# Patient Record
Sex: FEMALE | Race: WHITE | NOT HISPANIC OR LATINO | Employment: FULL TIME | URBAN - METROPOLITAN AREA
[De-identification: names, ages, dates, MRNs, and addresses within clinical notes are randomized per-mention and may not be internally consistent; named-entity substitution may affect disease eponyms.]

---

## 2019-02-05 ENCOUNTER — HOSPITAL ENCOUNTER (EMERGENCY)
Facility: CLINIC | Age: 54
Discharge: HOME | End: 2019-02-05
Attending: EMERGENCY MEDICINE | Admitting: EMERGENCY MEDICINE
Payer: COMMERCIAL

## 2019-02-05 VITALS
HEART RATE: 65 BPM | OXYGEN SATURATION: 98 % | WEIGHT: 134 LBS | BODY MASS INDEX: 21.53 KG/M2 | DIASTOLIC BLOOD PRESSURE: 63 MMHG | SYSTOLIC BLOOD PRESSURE: 122 MMHG | TEMPERATURE: 97.9 F | HEIGHT: 66 IN | RESPIRATION RATE: 16 BRPM

## 2019-02-05 DIAGNOSIS — R19.5 STOOL CLAY COLOR: ICD-10-CM

## 2019-02-05 LAB
ALBUMIN SERPL-MCNC: 3.3 G/DL (ref 3.4–5)
ALBUMIN UR-MCNC: NEGATIVE MG/DL
ALP SERPL-CCNC: 54 U/L (ref 40–150)
ALT SERPL W P-5'-P-CCNC: 35 U/L (ref 0–50)
ANION GAP SERPL CALCULATED.3IONS-SCNC: 5 MMOL/L (ref 3–14)
APPEARANCE UR: CLEAR
AST SERPL W P-5'-P-CCNC: 34 U/L (ref 0–45)
BASOPHILS # BLD AUTO: 0 10E9/L (ref 0–0.2)
BASOPHILS NFR BLD AUTO: 0.7 %
BILIRUB DIRECT SERPL-MCNC: 0.1 MG/DL (ref 0–0.2)
BILIRUB SERPL-MCNC: 0.3 MG/DL (ref 0.2–1.3)
BILIRUB UR QL STRIP: NEGATIVE
BUN SERPL-MCNC: 10 MG/DL (ref 7–30)
CALCIUM SERPL-MCNC: 8.9 MG/DL (ref 8.5–10.1)
CHLORIDE SERPL-SCNC: 105 MMOL/L (ref 94–109)
CO2 SERPL-SCNC: 29 MMOL/L (ref 20–32)
COLOR UR AUTO: NORMAL
CREAT SERPL-MCNC: 0.65 MG/DL (ref 0.52–1.04)
DIFFERENTIAL METHOD BLD: ABNORMAL
EOSINOPHIL # BLD AUTO: 0.1 10E9/L (ref 0–0.7)
EOSINOPHIL NFR BLD AUTO: 3.8 %
ERYTHROCYTE [DISTWIDTH] IN BLOOD BY AUTOMATED COUNT: 12.3 % (ref 10–15)
GFR SERPL CREATININE-BSD FRML MDRD: >90 ML/MIN/{1.73_M2}
GLUCOSE SERPL-MCNC: 75 MG/DL (ref 70–99)
GLUCOSE UR STRIP-MCNC: NEGATIVE MG/DL
HCT VFR BLD AUTO: 37.7 % (ref 35–47)
HGB BLD-MCNC: 12.9 G/DL (ref 11.7–15.7)
HGB UR QL STRIP: NEGATIVE
IMM GRANULOCYTES # BLD: 0 10E9/L (ref 0–0.4)
IMM GRANULOCYTES NFR BLD: 0.3 %
KETONES UR STRIP-MCNC: NEGATIVE MG/DL
LEUKOCYTE ESTERASE UR QL STRIP: NEGATIVE
LIPASE SERPL-CCNC: 60 U/L (ref 73–393)
LYMPHOCYTES # BLD AUTO: 1.1 10E9/L (ref 0.8–5.3)
LYMPHOCYTES NFR BLD AUTO: 38.6 %
MCH RBC QN AUTO: 32 PG (ref 26.5–33)
MCHC RBC AUTO-ENTMCNC: 34.2 G/DL (ref 31.5–36.5)
MCV RBC AUTO: 94 FL (ref 78–100)
MONOCYTES # BLD AUTO: 0.4 10E9/L (ref 0–1.3)
MONOCYTES NFR BLD AUTO: 12.6 %
NEUTROPHILS # BLD AUTO: 1.3 10E9/L (ref 1.6–8.3)
NEUTROPHILS NFR BLD AUTO: 44 %
NITRATE UR QL: NEGATIVE
NRBC # BLD AUTO: 0 10*3/UL
NRBC BLD AUTO-RTO: 0 /100
PH UR STRIP: 6 PH (ref 5–7)
PLATELET # BLD AUTO: 216 10E9/L (ref 150–450)
POTASSIUM SERPL-SCNC: 3.5 MMOL/L (ref 3.4–5.3)
PROT SERPL-MCNC: 6.9 G/DL (ref 6.8–8.8)
RBC # BLD AUTO: 4.03 10E12/L (ref 3.8–5.2)
SODIUM SERPL-SCNC: 139 MMOL/L (ref 133–144)
SOURCE: NORMAL
SP GR UR STRIP: 1 (ref 1–1.03)
UROBILINOGEN UR STRIP-MCNC: NORMAL MG/DL (ref 0–2)
WBC # BLD AUTO: 2.9 10E9/L (ref 4–11)

## 2019-02-05 PROCEDURE — 81003 URINALYSIS AUTO W/O SCOPE: CPT | Performed by: EMERGENCY MEDICINE

## 2019-02-05 PROCEDURE — 80076 HEPATIC FUNCTION PANEL: CPT | Performed by: EMERGENCY MEDICINE

## 2019-02-05 PROCEDURE — 99283 EMERGENCY DEPT VISIT LOW MDM: CPT

## 2019-02-05 PROCEDURE — 80048 BASIC METABOLIC PNL TOTAL CA: CPT | Performed by: EMERGENCY MEDICINE

## 2019-02-05 PROCEDURE — 85025 COMPLETE CBC W/AUTO DIFF WBC: CPT | Performed by: EMERGENCY MEDICINE

## 2019-02-05 PROCEDURE — 83690 ASSAY OF LIPASE: CPT | Performed by: EMERGENCY MEDICINE

## 2019-02-05 ASSESSMENT — ENCOUNTER SYMPTOMS
APPETITE CHANGE: 1
VOMITING: 1
LIGHT-HEADEDNESS: 1
NAUSEA: 1
ABDOMINAL PAIN: 1

## 2019-02-05 ASSESSMENT — MIFFLIN-ST. JEOR: SCORE: 1229.57

## 2019-02-05 NOTE — ED AVS SNAPSHOT
Emergency Department  [address] [place] [address]  Phone:  [phone]  Fax:  [phone]                                    Juliana Uribe   MRN: [id number]    Department:   Emergency Department   Date of Visit:  2/5/2019           After Visit Summary Signature Page    I have received my discharge instructions, and my questions have been answered. I have discussed any challenges I see with this plan with the nurse or doctor.    ..........................................................................................................................................  Patient/Patient Representative Signature      ..........................................................................................................................................  Patient Representative Print Name and Relationship to Patient    ..................................................               ................................................  Date                                   Time    ..........................................................................................................................................  Reviewed by Signature/Title    ...................................................              ..............................................  Date                                               Time          22EPIC Rev 08/18

## 2019-02-05 NOTE — ED PROVIDER NOTES
"  History     Chief Complaint:  Abdominal pain     HPI   Juliana Uribe is a 53 year old female who presents with abnormal stool. The patient has been nauseous with abdominal pain for the last 3 days and had a cold for the two weeks prior. This was accompanied by fatigue and lightheadedness, likely due to decreased appetite and low PO intake. On Sunday, she became lightheaded and ate some food, which prompted her to vomit. She decided to present to the ED today after having a worrisome \"barb-colored\" stool on Sunday, followed by another strange, \"fatty\" stool yesterday.     Allergies:  No known drug allergies     Medications:    Cholecalciferol  Topicort  Progesterone     Past Medical History:   Allergic contact dermatitis    Past Surgical History:    History reviewed. No pertinent surgical history.    Family History:    History reviewed. No pertinent family history.      Social History:  Smoking status: Former  Alcohol use: No  Drug use: No  Patient presents with .  PCP: Jaylen Blankenship   Marital Status:        Review of Systems   Constitutional: Positive for appetite change.   Gastrointestinal: Positive for abdominal pain, nausea and vomiting.   Neurological: Positive for light-headedness.   10 point review of systems performed and is negative except as above and in HPI.         Physical Exam     Patient Vitals for the past 24 hrs:   BP Temp Temp src Heart Rate Resp SpO2 Height Weight   02/05/19 1547 124/77 97.9  F (36.6  C) Oral 64 16 100 % 1.676 m (5' 6\") 60.8 kg (134 lb)      Physical Exam  General: Resting on the gurney, appears comfortable  Head:  The scalp, face, and head appear normal  Mouth/Throat: Mucus membranes are moist  CV:  Regular rate    Normal S1 and S2  No pathological murmur   Resp:  Breath sounds clear and equal bilaterally    Non-labored, no retractions or accessory muscle use    No coarseness    No wheezing   GI:  Abdomen is soft, no rigidity    No tenderness to " palpation  MS:  Normal motor assessment of all extremities.    Good capillary refill noted.  Skin:  No rash or lesions noted.  Neuro:   Speech is normal and fluent. No apparent deficit.  Psych: Awake. Alert.  Normal affect.      Appropriate interactions.      Emergency Department Course     Laboratory:  CBC: WNL (WBC 2.9 (L), HGB 12.9, )  BMP: WNL (Creatinine 0.65)  Hepatic Panel: Albumin 3.3 (L), o/w WNL  Lipase: 60 (L)    UA: Negative     Emergency Department Course:  IV inserted and blood drawn. This was sent to laboratory for testing, findings above.  The patient provided a urine sample here in the emergency department. This was sent for laboratory testing, findings above.     Past medical records, nursing notes, and vitals reviewed.  2043: I performed an exam of the patient and obtained history, as documented above. Findings and plan explained to the Patient. Patient discharged home with instructions regarding supportive care, medications, and reasons to return. The importance of close follow-up was reviewed.       Impression & Plan      Medical Decision Making:  Juliana Uribe is a 53 year old female presents the emergency department for evaluation with concern after a barb colored stool.  At the time she had had nausea and abdominal pain, however, she now feels completely better.  Her exam is benign and she has no tenderness.  Laboratory evaluation was undertaken and had no hepatic abnormalities.  Given that her symptoms have resolved, she is pain-free, and has normal labs I am comfortable with her following up as an outpatient.  She will return if she has recurrent barb colored stools or severe abdominal pain.    Diagnosis:    ICD-10-CM    1. Stool barb color R19.5      Disposition:  Discharged to home.     Shashank Best  2/5/2019    EMERGENCY DEPARTMENT    Scribe Disclosure:  Shashank AGOSTO Chi, am serving as a scribe at 5:35 PM on 2/5/2019 to document services personally performed by Indira Talbot MD  based on my observations and the provider's statements to me.        Indira Talbot MD  02/07/19 0523

## 2021-04-19 DIAGNOSIS — Z11.59 ENCOUNTER FOR SCREENING FOR OTHER VIRAL DISEASES: ICD-10-CM

## 2021-04-26 DIAGNOSIS — Z11.59 ENCOUNTER FOR SCREENING FOR OTHER VIRAL DISEASES: ICD-10-CM

## 2021-04-26 LAB
SARS-COV-2 RNA RESP QL NAA+PROBE: NORMAL
SPECIMEN SOURCE: NORMAL

## 2021-04-26 PROCEDURE — U0005 INFEC AGEN DETEC AMPLI PROBE: HCPCS | Performed by: SPECIALIST

## 2021-04-26 PROCEDURE — U0003 INFECTIOUS AGENT DETECTION BY NUCLEIC ACID (DNA OR RNA); SEVERE ACUTE RESPIRATORY SYNDROME CORONAVIRUS 2 (SARS-COV-2) (CORONAVIRUS DISEASE [COVID-19]), AMPLIFIED PROBE TECHNIQUE, MAKING USE OF HIGH THROUGHPUT TECHNOLOGIES AS DESCRIBED BY CMS-2020-01-R: HCPCS | Performed by: SPECIALIST

## 2021-04-27 LAB
LABORATORY COMMENT REPORT: NORMAL
SARS-COV-2 RNA RESP QL NAA+PROBE: NEGATIVE
SPECIMEN SOURCE: NORMAL

## 2021-04-28 RX ORDER — LIDOCAINE 40 MG/G
CREAM TOPICAL
Status: CANCELLED | OUTPATIENT
Start: 2021-04-28

## 2021-04-28 RX ORDER — ONDANSETRON 2 MG/ML
4 INJECTION INTRAMUSCULAR; INTRAVENOUS
Status: CANCELLED | OUTPATIENT
Start: 2021-04-28

## 2021-04-29 ENCOUNTER — HOSPITAL ENCOUNTER (OUTPATIENT)
Facility: CLINIC | Age: 56
Discharge: HOME | End: 2021-04-29
Attending: SPECIALIST | Admitting: SPECIALIST
Payer: COMMERCIAL

## 2021-04-29 VITALS
HEART RATE: 68 BPM | TEMPERATURE: 98.1 F | BODY MASS INDEX: 20.4 KG/M2 | OXYGEN SATURATION: 100 % | DIASTOLIC BLOOD PRESSURE: 77 MMHG | HEIGHT: 67 IN | SYSTOLIC BLOOD PRESSURE: 130 MMHG | RESPIRATION RATE: 17 BRPM | WEIGHT: 130 LBS

## 2021-04-29 LAB — COLONOSCOPY: NORMAL

## 2021-04-29 PROCEDURE — 45385 COLONOSCOPY W/LESION REMOVAL: CPT | Performed by: SPECIALIST

## 2021-04-29 PROCEDURE — G0500 MOD SEDAT ENDO SERVICE >5YRS: HCPCS | Performed by: SPECIALIST

## 2021-04-29 PROCEDURE — 88305 TISSUE EXAM BY PATHOLOGIST: CPT | Mod: 26 | Performed by: PATHOLOGY

## 2021-04-29 PROCEDURE — 45381 COLONOSCOPY SUBMUCOUS NJX: CPT | Performed by: SPECIALIST

## 2021-04-29 PROCEDURE — 88305 TISSUE EXAM BY PATHOLOGIST: CPT | Mod: TC | Performed by: SPECIALIST

## 2021-04-29 PROCEDURE — 45380 COLONOSCOPY AND BIOPSY: CPT | Performed by: SPECIALIST

## 2021-04-29 PROCEDURE — 250N000011 HC RX IP 250 OP 636: Performed by: SPECIALIST

## 2021-04-29 PROCEDURE — 99153 MOD SED SAME PHYS/QHP EA: CPT | Performed by: SPECIALIST

## 2021-04-29 RX ORDER — FENTANYL CITRATE 50 UG/ML
INJECTION, SOLUTION INTRAMUSCULAR; INTRAVENOUS PRN
Status: COMPLETED | OUTPATIENT
Start: 2021-04-29 | End: 2021-04-29

## 2021-04-29 RX ADMIN — MIDAZOLAM 2 MG: 1 INJECTION INTRAMUSCULAR; INTRAVENOUS at 07:37

## 2021-04-29 RX ADMIN — FENTANYL CITRATE 50 MCG: 50 INJECTION, SOLUTION INTRAMUSCULAR; INTRAVENOUS at 07:49

## 2021-04-29 RX ADMIN — MIDAZOLAM 1 MG: 1 INJECTION INTRAMUSCULAR; INTRAVENOUS at 07:45

## 2021-04-29 RX ADMIN — FENTANYL CITRATE 100 MCG: 50 INJECTION, SOLUTION INTRAMUSCULAR; INTRAVENOUS at 07:37

## 2021-04-29 ASSESSMENT — MIFFLIN-ST. JEOR: SCORE: 1209.37

## 2021-04-29 NOTE — H&P
Pre-Endoscopy History and Physical     Juliana Uribe MRN# [id number]   YOB: 1965 Age: 55 year old     Date of Procedure: 2021  Primary care provider: Joaquín Fitzpatrick  Type of Endoscopy: Colonoscopy with possible biopsy, possible polypectomy  Reason for Procedure: positive cologuard test  Type of Anesthesia Anticipated: Conscious Sedation    HPI:    Juliana is a 55 year old female who will be undergoing the above procedure.      A history and physical has been performed. The patient's medications and allergies have been reviewed. The risks and benefits of the procedure and the sedation options and risks were discussed with the patient.  All questions were answered and informed consent was obtained.      She denies a personal or family history of anesthesia complications or bleeding disorders.     Patient Active Problem List   Diagnosis     Allergic contact dermatitis     Dermatitis        Past Medical History:   Diagnosis Date     Bilateral tinnitus         Past Surgical History:   Procedure Laterality Date     ANGIOPLASTY Left     of left carotid after jaw surgery      MANDIBLE SURGERY         Social History     Tobacco Use     Smoking status: Former Smoker     Quit date: 1997     Years since quittin.3     Smokeless tobacco: Never Used   Substance Use Topics     Alcohol use: Yes     Comment: 3 or 4 a day        History reviewed. No pertinent family history.    Prior to Admission medications    Medication Sig Start Date End Date Taking? Authorizing Provider   Cholecalciferol (VITAMIN D) 1000 UNITS capsule Take 1 capsule by mouth 2 times daily.    Reported, Patient   desoximetasone (TOPICORT) 0.25 % OINT Apply on itchy areas of body twice daily 12   Josué Arce MD   Multiple Minerals TABS Take  by mouth daily.    Reported, Patient   omega-3 fatty acids (FISH OIL) 1200 MG capsule Take 1 capsule by mouth 2 times daily.    Reported, Patient   PROGESTERONE     Reported,  "Patient   Turmeric Curcumin 500 MG CAPS Take  by mouth daily.    Reported, Patient       No Known Allergies     REVIEW OF SYSTEMS:   5 point ROS negative except as noted above in HPI, including Gen., Resp., CV, GI &  system review.    PHYSICAL EXAM:   /76   Pulse 67   Temp 98.1  F (36.7  C) (Skin)   Resp 16   Ht 1.689 m (5' 6.5\")   Wt 59 kg (130 lb)   BMI 20.67 kg/m   Estimated body mass index is 20.67 kg/m  as calculated from the following:    Height as of this encounter: 1.689 m (5' 6.5\").    Weight as of this encounter: 59 kg (130 lb).   GENERAL APPEARANCE: alert, and oriented  MENTAL STATUS: alert  AIRWAY EXAM: Mallampatti Class II (visualization of the soft palate, fauces, and uvula)  RESP: lungs clear to auscultation - no rales, rhonchi or wheezes  CV: regular rates and rhythm  DIAGNOSTICS:    Not indicated    IMPRESSION   ASA Class 1 - Healthy patient, no medical problems    PLAN:   Plan for Colonoscopy with possible biopsy, possible polypectomy. We discussed the risks, benefits and alternatives and the patient wished to proceed.    The above has been forwarded to the consulting provider.      Signed Electronically by: Helder Aguayo MD  April 29, 2021          "

## 2021-04-30 ENCOUNTER — HOSPITAL ENCOUNTER (INPATIENT)
Facility: CLINIC | Age: 56
LOS: 2 days | Discharge: HOME | DRG: 921 | End: 2021-05-02
Attending: EMERGENCY MEDICINE | Admitting: STUDENT IN AN ORGANIZED HEALTH CARE EDUCATION/TRAINING PROGRAM
Payer: COMMERCIAL

## 2021-04-30 ENCOUNTER — APPOINTMENT (OUTPATIENT)
Dept: CT IMAGING | Facility: CLINIC | Age: 56
DRG: 921 | End: 2021-04-30
Attending: EMERGENCY MEDICINE
Payer: COMMERCIAL

## 2021-04-30 DIAGNOSIS — K91.71 PERFORATION OF COLON AS COLONOSCOPY COMPLICATION (H): ICD-10-CM

## 2021-04-30 DIAGNOSIS — K63.1 PERFORATION OF COLON AS COLONOSCOPY COMPLICATION (H): ICD-10-CM

## 2021-04-30 PROBLEM — N95.1 MENOPAUSAL FLUSHING: Status: ACTIVE | Noted: 2019-02-05

## 2021-04-30 PROBLEM — R68.89 ABNORMAL WEIGHT: Status: ACTIVE | Noted: 2019-02-05

## 2021-04-30 LAB
ABO + RH BLD: NORMAL
ABO + RH BLD: NORMAL
ALBUMIN SERPL-MCNC: 3.4 G/DL (ref 3.4–5)
ALP SERPL-CCNC: 46 U/L (ref 40–150)
ALT SERPL W P-5'-P-CCNC: 33 U/L (ref 0–50)
ANION GAP SERPL CALCULATED.3IONS-SCNC: 4 MMOL/L (ref 3–14)
AST SERPL W P-5'-P-CCNC: 35 U/L (ref 0–45)
BASOPHILS # BLD AUTO: 0 10E9/L (ref 0–0.2)
BASOPHILS NFR BLD AUTO: 0.4 %
BILIRUB SERPL-MCNC: 0.7 MG/DL (ref 0.2–1.3)
BLD GP AB SCN SERPL QL: NORMAL
BLOOD BANK CMNT PATIENT-IMP: NORMAL
BUN SERPL-MCNC: 11 MG/DL (ref 7–30)
CALCIUM SERPL-MCNC: 8.5 MG/DL (ref 8.5–10.1)
CHLORIDE SERPL-SCNC: 107 MMOL/L (ref 94–109)
CO2 SERPL-SCNC: 26 MMOL/L (ref 20–32)
COPATH REPORT: NORMAL
CREAT SERPL-MCNC: 0.64 MG/DL (ref 0.52–1.04)
DIFFERENTIAL METHOD BLD: ABNORMAL
EOSINOPHIL # BLD AUTO: 0 10E9/L (ref 0–0.7)
EOSINOPHIL NFR BLD AUTO: 0.4 %
ERYTHROCYTE [DISTWIDTH] IN BLOOD BY AUTOMATED COUNT: 11.9 % (ref 10–15)
GFR SERPL CREATININE-BSD FRML MDRD: >90 ML/MIN/{1.73_M2}
GLUCOSE SERPL-MCNC: 77 MG/DL (ref 70–99)
HCT VFR BLD AUTO: 38 % (ref 35–47)
HGB BLD-MCNC: 12.7 G/DL (ref 11.7–15.7)
IMM GRANULOCYTES # BLD: 0 10E9/L (ref 0–0.4)
IMM GRANULOCYTES NFR BLD: 0.4 %
LABORATORY COMMENT REPORT: NORMAL
LYMPHOCYTES # BLD AUTO: 0.9 10E9/L (ref 0.8–5.3)
LYMPHOCYTES NFR BLD AUTO: 8 %
MCH RBC QN AUTO: 32.5 PG (ref 26.5–33)
MCHC RBC AUTO-ENTMCNC: 33.4 G/DL (ref 31.5–36.5)
MCV RBC AUTO: 97 FL (ref 78–100)
MONOCYTES # BLD AUTO: 0.7 10E9/L (ref 0–1.3)
MONOCYTES NFR BLD AUTO: 5.8 %
NEUTROPHILS # BLD AUTO: 9.5 10E9/L (ref 1.6–8.3)
NEUTROPHILS NFR BLD AUTO: 85 %
NRBC # BLD AUTO: 0 10*3/UL
NRBC BLD AUTO-RTO: 0 /100
PLATELET # BLD AUTO: 199 10E9/L (ref 150–450)
POTASSIUM SERPL-SCNC: 4 MMOL/L (ref 3.4–5.3)
PROT SERPL-MCNC: 6.9 G/DL (ref 6.8–8.8)
RBC # BLD AUTO: 3.91 10E12/L (ref 3.8–5.2)
SARS-COV-2 RNA RESP QL NAA+PROBE: NEGATIVE
SODIUM SERPL-SCNC: 137 MMOL/L (ref 133–144)
SPECIMEN EXP DATE BLD: NORMAL
SPECIMEN SOURCE: NORMAL
WBC # BLD AUTO: 11.1 10E9/L (ref 4–11)

## 2021-04-30 PROCEDURE — 86850 RBC ANTIBODY SCREEN: CPT | Performed by: EMERGENCY MEDICINE

## 2021-04-30 PROCEDURE — 250N000011 HC RX IP 250 OP 636: Performed by: EMERGENCY MEDICINE

## 2021-04-30 PROCEDURE — 258N000003 HC RX IP 258 OP 636: Performed by: EMERGENCY MEDICINE

## 2021-04-30 PROCEDURE — 99223 1ST HOSP IP/OBS HIGH 75: CPT | Mod: AI | Performed by: STUDENT IN AN ORGANIZED HEALTH CARE EDUCATION/TRAINING PROGRAM

## 2021-04-30 PROCEDURE — 120N000001 HC R&B MED SURG/OB

## 2021-04-30 PROCEDURE — 96375 TX/PRO/DX INJ NEW DRUG ADDON: CPT

## 2021-04-30 PROCEDURE — 74177 CT ABD & PELVIS W/CONTRAST: CPT

## 2021-04-30 PROCEDURE — 250N000009 HC RX 250: Performed by: EMERGENCY MEDICINE

## 2021-04-30 PROCEDURE — C9803 HOPD COVID-19 SPEC COLLECT: HCPCS

## 2021-04-30 PROCEDURE — 86901 BLOOD TYPING SEROLOGIC RH(D): CPT | Performed by: EMERGENCY MEDICINE

## 2021-04-30 PROCEDURE — 258N000003 HC RX IP 258 OP 636: Performed by: STUDENT IN AN ORGANIZED HEALTH CARE EDUCATION/TRAINING PROGRAM

## 2021-04-30 PROCEDURE — 87635 SARS-COV-2 COVID-19 AMP PRB: CPT | Performed by: EMERGENCY MEDICINE

## 2021-04-30 PROCEDURE — 80053 COMPREHEN METABOLIC PANEL: CPT | Performed by: EMERGENCY MEDICINE

## 2021-04-30 PROCEDURE — 86900 BLOOD TYPING SEROLOGIC ABO: CPT | Performed by: EMERGENCY MEDICINE

## 2021-04-30 PROCEDURE — 96365 THER/PROPH/DIAG IV INF INIT: CPT | Mod: 59

## 2021-04-30 PROCEDURE — 250N000013 HC RX MED GY IP 250 OP 250 PS 637: Performed by: STUDENT IN AN ORGANIZED HEALTH CARE EDUCATION/TRAINING PROGRAM

## 2021-04-30 PROCEDURE — 96361 HYDRATE IV INFUSION ADD-ON: CPT

## 2021-04-30 PROCEDURE — 250N000011 HC RX IP 250 OP 636: Performed by: STUDENT IN AN ORGANIZED HEALTH CARE EDUCATION/TRAINING PROGRAM

## 2021-04-30 PROCEDURE — 85025 COMPLETE CBC W/AUTO DIFF WBC: CPT | Performed by: EMERGENCY MEDICINE

## 2021-04-30 PROCEDURE — 99285 EMERGENCY DEPT VISIT HI MDM: CPT | Mod: 25

## 2021-04-30 RX ORDER — PIPERACILLIN SODIUM, TAZOBACTAM SODIUM 3; .375 G/15ML; G/15ML
3.38 INJECTION, POWDER, LYOPHILIZED, FOR SOLUTION INTRAVENOUS ONCE
Status: COMPLETED | OUTPATIENT
Start: 2021-04-30 | End: 2021-04-30

## 2021-04-30 RX ORDER — NALOXONE HYDROCHLORIDE 0.4 MG/ML
0.2 INJECTION, SOLUTION INTRAMUSCULAR; INTRAVENOUS; SUBCUTANEOUS
Status: DISCONTINUED | OUTPATIENT
Start: 2021-04-30 | End: 2021-05-02 | Stop reason: HOSPADM

## 2021-04-30 RX ORDER — ONDANSETRON 4 MG/1
4 TABLET, ORALLY DISINTEGRATING ORAL EVERY 6 HOURS PRN
Status: DISCONTINUED | OUTPATIENT
Start: 2021-04-30 | End: 2021-05-02 | Stop reason: HOSPADM

## 2021-04-30 RX ORDER — LIDOCAINE 40 MG/G
CREAM TOPICAL
Status: DISCONTINUED | OUTPATIENT
Start: 2021-04-30 | End: 2021-05-02 | Stop reason: HOSPADM

## 2021-04-30 RX ORDER — SODIUM CHLORIDE 9 MG/ML
INJECTION, SOLUTION INTRAVENOUS CONTINUOUS
Status: DISCONTINUED | OUTPATIENT
Start: 2021-04-30 | End: 2021-04-30

## 2021-04-30 RX ORDER — SODIUM CHLORIDE, SODIUM LACTATE, POTASSIUM CHLORIDE, CALCIUM CHLORIDE 600; 310; 30; 20 MG/100ML; MG/100ML; MG/100ML; MG/100ML
INJECTION, SOLUTION INTRAVENOUS CONTINUOUS
Status: DISCONTINUED | OUTPATIENT
Start: 2021-04-30 | End: 2021-05-01

## 2021-04-30 RX ORDER — KETOROLAC TROMETHAMINE 15 MG/ML
15 INJECTION, SOLUTION INTRAMUSCULAR; INTRAVENOUS ONCE
Status: COMPLETED | OUTPATIENT
Start: 2021-04-30 | End: 2021-04-30

## 2021-04-30 RX ORDER — IOPAMIDOL 755 MG/ML
63 INJECTION, SOLUTION INTRAVASCULAR ONCE
Status: COMPLETED | OUTPATIENT
Start: 2021-04-30 | End: 2021-04-30

## 2021-04-30 RX ORDER — NALOXONE HYDROCHLORIDE 0.4 MG/ML
0.4 INJECTION, SOLUTION INTRAMUSCULAR; INTRAVENOUS; SUBCUTANEOUS
Status: DISCONTINUED | OUTPATIENT
Start: 2021-04-30 | End: 2021-05-02 | Stop reason: HOSPADM

## 2021-04-30 RX ORDER — ACETAMINOPHEN 325 MG/1
650 TABLET ORAL EVERY 4 HOURS PRN
Status: DISCONTINUED | OUTPATIENT
Start: 2021-04-30 | End: 2021-05-02 | Stop reason: HOSPADM

## 2021-04-30 RX ORDER — PIPERACILLIN SODIUM, TAZOBACTAM SODIUM 3; .375 G/15ML; G/15ML
3.38 INJECTION, POWDER, LYOPHILIZED, FOR SOLUTION INTRAVENOUS EVERY 6 HOURS
Status: DISCONTINUED | OUTPATIENT
Start: 2021-04-30 | End: 2021-05-02 | Stop reason: HOSPADM

## 2021-04-30 RX ORDER — UBIDECARENONE 100 MG
100 CAPSULE ORAL DAILY
COMMUNITY

## 2021-04-30 RX ORDER — LANOLIN ALCOHOL/MO/W.PET/CERES
3 CREAM (GRAM) TOPICAL
Status: DISCONTINUED | OUTPATIENT
Start: 2021-04-30 | End: 2021-04-30

## 2021-04-30 RX ORDER — ONDANSETRON 2 MG/ML
4 INJECTION INTRAMUSCULAR; INTRAVENOUS EVERY 6 HOURS PRN
Status: DISCONTINUED | OUTPATIENT
Start: 2021-04-30 | End: 2021-05-02 | Stop reason: HOSPADM

## 2021-04-30 RX ORDER — OXYCODONE HYDROCHLORIDE 5 MG/1
5-10 TABLET ORAL
Status: DISCONTINUED | OUTPATIENT
Start: 2021-04-30 | End: 2021-05-02 | Stop reason: HOSPADM

## 2021-04-30 RX ADMIN — PIPERACILLIN SODIUM AND TAZOBACTAM SODIUM 3.38 G: 3; .375 INJECTION, POWDER, LYOPHILIZED, FOR SOLUTION INTRAVENOUS at 16:13

## 2021-04-30 RX ADMIN — SODIUM CHLORIDE 1000 ML: 9 INJECTION, SOLUTION INTRAVENOUS at 09:05

## 2021-04-30 RX ADMIN — SODIUM CHLORIDE, POTASSIUM CHLORIDE, SODIUM LACTATE AND CALCIUM CHLORIDE: 600; 310; 30; 20 INJECTION, SOLUTION INTRAVENOUS at 14:34

## 2021-04-30 RX ADMIN — PIPERACILLIN SODIUM AND TAZOBACTAM SODIUM 3.38 G: 3; .375 INJECTION, POWDER, LYOPHILIZED, FOR SOLUTION INTRAVENOUS at 10:44

## 2021-04-30 RX ADMIN — IOPAMIDOL 63 ML: 755 INJECTION, SOLUTION INTRAVENOUS at 09:31

## 2021-04-30 RX ADMIN — KETOROLAC TROMETHAMINE 15 MG: 15 INJECTION, SOLUTION INTRAMUSCULAR; INTRAVENOUS at 09:05

## 2021-04-30 RX ADMIN — PIPERACILLIN SODIUM AND TAZOBACTAM SODIUM 3.38 G: 3; .375 INJECTION, POWDER, LYOPHILIZED, FOR SOLUTION INTRAVENOUS at 22:20

## 2021-04-30 RX ADMIN — SODIUM CHLORIDE 60 ML: 9 INJECTION, SOLUTION INTRAVENOUS at 09:31

## 2021-04-30 RX ADMIN — ACETAMINOPHEN 650 MG: 325 TABLET, FILM COATED ORAL at 22:17

## 2021-04-30 ASSESSMENT — ENCOUNTER SYMPTOMS
BLOOD IN STOOL: 1
ABDOMINAL PAIN: 1
SHORTNESS OF BREATH: 0

## 2021-04-30 ASSESSMENT — ACTIVITIES OF DAILY LIVING (ADL)
DIFFICULTY_EATING/SWALLOWING: NO
DRESSING/BATHING_DIFFICULTY: NO
HEARING_DIFFICULTY_OR_DEAF: NO
DOING_ERRANDS_INDEPENDENTLY_DIFFICULTY: NO
WEAR_GLASSES_OR_BLIND: NO
TOILETING_ISSUES: NO
PATIENT_/_FAMILY_COMMUNICATION_STYLE: SPOKEN LANGUAGE (ENGLISH OR BILINGUAL)
FALL_HISTORY_WITHIN_LAST_SIX_MONTHS: NO
WALKING_OR_CLIMBING_STAIRS_DIFFICULTY: NO
ADLS_ACUITY_SCORE: 12
DIFFICULTY_COMMUNICATING: NO
ADLS_ACUITY_SCORE: 12
CONCENTRATING,_REMEMBERING_OR_MAKING_DECISIONS_DIFFICULTY: NO

## 2021-04-30 NOTE — CONSULTS
GI    Patient known to me from yesterday.    CC: abdominal pain    HPI. 55 y.o. woman seen yesterday for a colonoscopy to evaluate positive cologuard. A large flat polyp  Was identified in the ascending colon and removed with Orize gel assisted hot snare polypectomy; the edges were trimmed with a cold snare. She did fine initially (went shopping and to the [facility]) until the afternoon when she started to experience pain and cramping. This morning, she noted worsened pain and passed some black stool. She came into the Endoscopy unit and was advised to go to the ED.     She received some Toradol and her pain is better.     Meds: Toradol, Vitamin D, Progesterone, omega III.  Allergies: NKDA    PMHx: Allergic contact dermatitis, Tinnitus, Anxiety/Depression.  PSHx: Angioplasty of left carotid after mandible surgery. Breast Augmentation.  SHx: Former smoker. Past history of alcohol misuse.   FHx: Not contributory.    ROS: Otherwise negative.     PE: WD, WN healthy appearing tearful woman, lying upright in bed.   04/30/21 0819 132/71 97.6  F (36.4  C) Oral 77 16 100 % 56.7 kg (125 lb)     Vital signs remain stable, afebrile.  HENT normal.  Lungs: clear to auscultation bilaterally.  Cor: RRR normal heart tones.  Abd: Non-distended, soft, non-tender, active bowel sounds.   Extrem: non focal.     Labs: see Epic results; WBC 11.1. Hb 12.7. CMP normal.    CT scan reviewed with Luis Carlos High: retroperitoneal air on the right side.     Assess: Post polypectomy bleed and perforation. This appears to be a contained perforation. We may be able to manage this medically with IV antibiotics, NPO and clinical observation.     Plan: Admit to hospital. Colorectal surgery consultation.    Helder Aguayo MD  [phone]    Addendum: I spoke with Dr. Stanley from colon and rectal surgery; she concurs with my assessment and initial plan to manage medically. Will start on IV Zosyn and admit to the hospitalist service. I will follow  clinically.

## 2021-04-30 NOTE — ED NOTES
Report received. Patient visualized. Vital signs grisel stable. Patient is resting in bed. Complains of being cold and having chills. Patients temp was taken and is 98. Patient given warm blankets for comfort. Denies additional needs at this time. Will continue to monitor

## 2021-04-30 NOTE — PLAN OF CARE
VSS on room air. Pain controlled with rest and PRN meds. A/Ox4. Clear lung sounds. Voiding adequately. Passing gas. No bloody bowel movements during shift. NPO. Up independent. Fluids running per order. Denies nausea.

## 2021-04-30 NOTE — ED NOTES
[facility]  ED Nurse Handoff Report    ED Chief complaint: Abdominal Pain and GI Bleeding      ED Diagnosis:   Final diagnoses:   Perforation of colon as colonoscopy complication (H)       Code Status: to be assessed by admitting provider     Allergies: No Known Allergies    Patient Story: Patient arrives to the ed with complaints of abdominal pain and black stools. Patient had a colonoscopy yesterday and had a polyp removed. Patient states that after the pain medications wore off she began having severe right sided abdominal pain. Patient called her surgeon this morning who referred her to the ED where she was found to have a perforated bowel. Patient states that the pain is worse when she is taking coughing or laughing. Patient was given Toradol in the ED and states her pain is much better   Focused Assessment:    Resp:WDL   Cardiac:WDL   Neuro:WDL   GI: patient has right sided abdominal pain that started after her colonoscopy and polyp removal. Patients CT shows a bowel perforation   :WDL     Treatments and/or interventions provided: Medications, fluids, abx   Patient's response to treatments and/or interventions: improvement of signs and symptoms     To be done/followed up on inpatient unit:  control pain, continue to monitor     Does this patient have any cognitive concerns?: none     Activity level - Baseline/Home:  Independent  Activity Level - Current:   Independent    Patient's Preferred language: English   Needed?: No    Isolation: None  Infection: Not Applicable  Patient tested for COVID 19 prior to admission: YES  Bariatric?: No    Vital Signs:   Vitals:    04/30/21 0819 04/30/21 1140   BP: 132/71 124/73   Pulse: 77 71   Resp: 16    Temp: 97.6  F (36.4  C)    TempSrc: Oral    SpO2: 100% 99%   Weight: 56.7 kg (125 lb)        Cardiac Rhythm:     Was the PSS-3 completed:   Yes  What interventions are required if any?               Family Comments: none   OBS brochure/video  discussed/provided to patient/family: Yes              Name of person given brochure if not patient: n.a               Relationship to patient: n.a     For the majority of the shift this patient's behavior was Green.   Behavioral interventions performed were n.a .    ED NURSE PHONE NUMBER: *[id number]

## 2021-04-30 NOTE — ED PROVIDER NOTES
History   Chief Complaint:  Abdominal Pain and GI Bleeding     HPI   Juliana Uribe is an otherwise healthy 55 year old female who presents for evaluation of abdominal pain and black tarry stool after undergoing colonoscopy and polypectomy procedure yesterday. She states that since her pain medication has worn off, she's been having severe, localized right lower quadrant abdominal pain. This morning, she had a black tarry bowel movement so she called Dr. Aguayo, the gastroenterologist who performed the colonoscopy who referred her to the ED. She reports worsening pain with movement, coughing, or laughing. She denies difficulty breathing. She took Tylenol but has not taken other pain medications. She is not on blood thinners.    Review of Systems   Respiratory: Negative for shortness of breath.    Gastrointestinal: Positive for abdominal pain and blood in stool.   All other systems reviewed and are negative.        Allergies:  Citalopram  Bupropion    Medications:  Cholecalciferol  Progesterone    Past Medical History:    Allergic contact dermatitis  Bilateral tinnitus   Migraine  Blastocystic hominis infection  Premenstrual dysphoric disorder  Alcohol abuse (sober since 04/11)  Depression    Past Surgical History:    Angioplasty   Colonoscopy   Mandible surgery   Breast augmentation  Septoplasty   Repair external carotid artery fistula     Family History:    MOHs procedure  Alcohol/drug - brother    Social History:  Presents to the ED: with her significant other   Tobacco use: Former Smoker  Alcohol use: Yes   Drug use: Never   PCP: Joaquín Fitzpatrick MD    Physical Exam     Patient Vitals for the past 24 hrs:   BP Temp Temp src Pulse Resp SpO2 Weight   04/30/21 1140 124/73 -- -- 71 -- 99 % --   04/30/21 0819 132/71 97.6  F (36.4  C) Oral 77 16 100 % 56.7 kg (125 lb)       Physical Exam  Vitals signs reviewed.   HENT:      Head: Normocephalic.   Eyes:      General: No scleral icterus.  Cardiovascular:       Rate and Rhythm: Normal rate and regular rhythm.   Pulmonary:      Effort: Pulmonary effort is normal.      Breath sounds: Normal breath sounds.   Abdominal:      Tenderness: There is abdominal tenderness in the right upper quadrant, right lower quadrant and periumbilical area.   Skin:     Capillary Refill: Capillary refill takes less than 2 seconds.   Neurological:      General: No focal deficit present.      Mental Status: She is alert.   Psychiatric:         Mood and Affect: Mood is anxious.      Comments: Tearful         Emergency Department Course     Imaging:  CT Abdomen Pelvis with Contrast  1.  Free air adjacent to the ascending colon and also in the right   upper quadrant near the liver, representing ascending colon   perforation. No free fluid or fluid collections.   2.  Mild wall thickening of the ascending colon is not well assessed.     Reading per radiology.    Laboratory:  CBC: WBC: 11.1 (H), HGB: 12.7, PLT: 199    CMP: WNL (Glucose 77, Creatinine: 0.64)    ABO/Rh type and screen: O-, antibody negative    Asymptomatic COVID-19 PCR: Negative    Emergency Department Course:    Reviewed:  I reviewed the patient's nursing notes, vitals and past medical history.     Assessments:  0830 I performed an exam of the patient in room ED09 as documented above.  0842 Patient rechecked and updated.    1011 Patient updated on imaging results and likely plan of care including antibiotics and possible hospitalization and surgical intervention.   1030 Patient rechecked and updated regarding my conversation with surgery. Dr. Aguayo bedside    Consults:    0948 I consulted with Dr. Aguayo from gastroenterology who performed the patient's procedure 04/29/21.   1007 I spoke with Dr. Fahrner from radiology regarding patient's CT results.  1027 I spoke with Ella Cornell PA-C from colon & rectal surgery regarding patient's plan of care.  1043 I spoke with Dr. Mulligan of the hospitalist service regarding patient's presentation,  findings, and plan of care.    Interventions:  0905 NS, 1 L, IV  0905 Toradol, 15 mg, IV   1044 Zosyn, 3.375 g, IV    Disposition:  The patient was admitted to the hospital under the care of Dr. Chino Mulligan.     Impression & Plan     CMS Diagnoses: None    Medical Decision Making:  Juliana Uribe is a 55 year old female who presents to the emergency department today for evaluation of abdominal pain post colonoscopy.  Examination shows tenderness in the right lower quadrant.  Patient also gives a history of black tarry stool.  Hemoglobin is stable CT scan is positive for localized peripherally perforation of the colon likely from biopsy.  Care was discussed with the gastroenterologist as well as colorectal surgery.  Will recommend admission empiric antibiotics to prevent peritonitis and observation.  Patient is also discussed with the hospitalist and was admitted as an inpatient.      Covid-19  Juliana Uribe was evaluated during a global COVID-19 pandemic, which necessitated consideration that the patient might be at risk for infection with the SARS-CoV-2 virus that causes COVID-19.   Applicable protocols for evaluation were followed during the patient's care.   COVID-19 was considered as part of the patient's evaluation. The plan for testing is:  a test was obtained during this visit.    Diagnosis:    ICD-10-CM    1. Perforation of colon as colonoscopy complication (H)  K63.1 Asymptomatic SARS-CoV-2 COVID-19 Virus (Coronavirus) by PCR    K91.71        Scribe Disclosure:  I, Ivett Tavares, am serving as a scribe at 8:29 AM on 4/30/2021 to document services personally performed by Oliver Smith MD based on my observations and the provider's statements to me.    This note was completed in part using Dragon voice recognition software. Although reviewed after completion, some word and grammatical errors may occur.      Oliver Smith MD  05/04/21 8024

## 2021-04-30 NOTE — PHARMACY-ADMISSION MEDICATION HISTORY
Pharmacy Medication History  Admission medication history interview status for the 4/30/2021  admission is complete. See EPIC admission navigator for prior to admission medications     Location of Interview: Patient room  Medication history sources: Patient    Significant changes made to the medication list:  Added melatonin, biestrogen, milk thistle tincture, co-enzyme q 10  Removed desoximetasone     In the past week, patient estimated taking medication this percent of the time: greater than 90%    Additional medication history information:   None    Medication reconciliation completed by provider prior to medication history? No    Time spent in this activity: 10 minutes    Prior to Admission medications    Medication Sig Last Dose Taking? Auth Provider   BIESTROGEN WITH PROGESTERONE 1.25MG/50MG COMPOUND Take 1 capsule by mouth At Bedtime (50-50-1.25 mg) 4/29/2021 at Unknown time Yes Unknown, Entered By History   Cholecalciferol (VITAMIN D) 1000 UNITS capsule Take 1 capsule by mouth 2 times daily. 4/29/2021 at Unknown time Yes Reported, Patient   co-enzyme Q-10 100 MG CAPS capsule Take 100 mg by mouth daily 4/29/2021 at Unknown time Yes Unknown, Entered By History   melatonin (MELATONIN) 1 MG/ML LIQD liquid Take 1-5 mg by mouth nightly as needed for sleep Past Week at Unknown time Yes Unknown, Entered By History   Multiple Minerals TABS Take  by mouth daily. 4/29/2021 at Unknown time Yes Reported, Patient   omega-3 fatty acids (FISH OIL) 1200 MG capsule Take 1 capsule by mouth 2 times daily. 4/29/2021 at Unknown time Yes Reported, Patient   Turmeric Curcumin 500 MG CAPS Take  by mouth daily. 4/29/2021 at Unknown time Yes Reported, Patient   UNABLE TO FIND Take 1-2 Application by mouth daily as needed MEDICATION NAME: Milk thistle tincture 4/30/2021 at Unknown time Yes Unknown, Entered By History       The information provided in this note is only as accurate as the sources available at the time of update(s)

## 2021-04-30 NOTE — CONSULTS
[facility]  Colon and Rectal Surgery Consult Note  Name: Juliana Uribe    MRN: [id number]  YOB: 1965    Age: 55 year old  Date of admission: 4/30/2021  Primary care provider: Joaquín Fitzpatrick     Requesting Physician:  Dr. Mulligan  Reason for consult:  Ascending colon perforation s/p colonoscopy           History of Present Illness:   Juliana Uribe is a 55 year old female with PMHx of midgraines, seen at the request of Dr. Mulligan, who presents with acute onset abdominal pain. The patient underwent a colonoscopy yesterday with Dr. Aguayo at [facility]. There was a 25 mm polyps removed in the ascending colon with hot snare and injection-lift technique. Yesterday evening she had acute onset of significant right lower quadrant pain.  She had a black tarry stool this morning.  She called her gastroenterologist, Dr. Aguayo and it was recommended that she present to [facility] emergency department.  She reports that her pain is worse with any movement, coughing, or laughing.  She did not take any pain medications at home.  She had this colonoscopy after a positive Cologuard test.    In the emergency department her vital signs were stable.  Her white blood count was 11.1, hemoglobin 12.7, and platelets 199.  Her CMP was within normal limits.  A CT of the abdomen pelvis with contrast showed free air adjacent to the ascending colon and in the right upper quadrant near the liver, consistent with an a sending colon perforation.  There is no free fluid or fluid collections noted. She was given Toradol and noted great improvement in her pain.    CRS was consulted for further management.  Currently she is boarding in the emergency department and resting in bed.  She reports she is comfortable after a Toradol injection.  She denies any nausea or vomiting.  She has not had any further bowel movements or bleeding.    Colonoscopy History:  Yesterday, 4/29/21, 25mm polyp  removed in ascending colon    Past abdominal surgery: None            Past Medical History:     Past Medical History:   Diagnosis Date     Bilateral tinnitus              Past Surgical History:     Past Surgical History:   Procedure Laterality Date     ANGIOPLASTY Left     of left carotid after jaw surgery      COLONOSCOPY N/A 2021    Procedure: COLONOSCOPY, WITH POLYPECTOMY AND BIOPSY;  Surgeon: Helder Aguayo MD;  Location:  GI     [facility]                 Social History:     Social History     Tobacco Use     Smoking status: Former Smoker     Quit date: 1997     Years since quittin.3     Smokeless tobacco: Never Used   Substance Use Topics     Alcohol use: Yes     Comment: 3 or 4 a day              Family History:     Family History   Problem Relation Age of Onset     No Known Problems Mother      No Known Problems Father              Allergies:   No Known Allergies          Medications:             Review of Systems:   A comprehensive greater than 10 system review of systems was carried out.  Pertinent positives and negatives are noted above.  Otherwise negative for contributory info.            Physical Exam:     Blood pressure 123/72, pulse 74, temperature 97.6  F (36.4  C), temperature source Oral, resp. rate 16, weight 56.7 kg (125 lb), SpO2 100 %.  No intake or output data in the 24 hours ending 21 1312  Exam:  General - Awake alert and oriented, appears  stated age  Pulm - Non-labored breathing with normal respiratory effort  CVS - reg rate and rhythm, no peripheral edema  Abd - Soft, mild tenderness in RLQ, non distended.  No guarding, rigidity or peritoneal signs.   Rectal exam was not performed.   Neuro - CN II-XII grossly intact  Musculoskeletal - extremities with no clubbing, cyanosis or edema; able to ambulate  Psych - responsive, alert, cooperative; oriented x3; appropriate mood and affect  External/skin - inspection reveals no rashes, lesions or ulcers, normal  coloring             Data Reviewed:     Recent Results (from the past 24 hour(s))   CT Abdomen Pelvis w Contrast    Narrative    CT ABDOMEN PELVIS W CONTRAST 4/30/2021 9:43 AM    CLINICAL HISTORY: RLQ abdominal pain; ascending colon bx right sided  pain since yesterday    TECHNIQUE: CT scan of the abdomen and pelvis was performed following  injection of IV contrast. Multiplanar reformats were obtained. Dose  reduction techniques were used.  CONTRAST: 63 mL Isovue-370    COMPARISON: None.    FINDINGS:   LOWER CHEST: Normal.    HEPATOBILIARY: Normal.    PANCREAS: Normal.    SPLEEN: Normal.    ADRENAL GLANDS: Normal.    KIDNEYS/BLADDER: Normal.    BOWEL: Normal stomach. Normal caliber of the small bowel. The appendix  is not discretely identified. There is free air adjacent to the  ascending colon and also in the right upper quadrant near the liver.  No fluid collections. Normal caliber of the colon. There is mild wall  thickening of the ascending colon which is not well assessed.    PELVIC ORGANS: Normal.    ADDITIONAL FINDINGS: None.    MUSCULOSKELETAL: Minimal lateral curvature of the spine.      Impression    IMPRESSION:   1.  Free air adjacent to the ascending colon and also in the right  upper quadrant near the liver, representing ascending colon  perforation. No free fluid or fluid collections.  2.  Mild wall thickening of the ascending colon is not well assessed.    Discussed with Dr. Smith by Dr. Fahrner over telephone at 10:08 AM    LESTER J FAHRNER, MD       Recent Labs   Lab 04/30/21  0859   WBC 11.1*   HGB 12.7   HCT 38.0   MCV 97        Recent Labs   Lab 04/30/21  0859      POTASSIUM 4.0   CHLORIDE 107   CO2 26   ANIONGAP 4   GLC 77   BUN 11   CR 0.64   GFRESTIMATED >90   GFRESTBLACK >90   ZACHARY 8.5   PROTTOTAL 6.9   ALBUMIN 3.4   BILITOTAL 0.7   ALKPHOS 46   AST 35   ALT 33         Assessment and Plan:   Juliana Uribe is a 55 year old female who presented with acute onset abdominal pain  after a colonoscopy yesterday, found to have an ascending colon perforation on CT scan. AVSS. Mild leukocytosis of 11.1. Abdominal exam is benign. Recommend conservative management with bowel rest, IV antibiotics and IV fluids. Will continue with serial abdominal exams, monitor for fevers and worsening pain.  CRS will continue to follow, please contact immediately for worsening symptoms.     Plan:  1. Admit to hospitalist service  2. Surgery: No indication for urgent surgery at this time. Serial abdominal exams.   3. Diet: NPO  4. IV Fluids: continue  5. Antibiotics:  continue  6. Medications:  none  7. I&O s:  strict I&O s   8. Labs:   - Reviewed: Yes  - Ordered: cbc tomorrow morning   9. Imaging:   - Dr. Stanley and myself have personally viewed: CT abd/pelvis  - Ordered:  none  10. Activity:  OOB, ambulate as able  11. DVT prophylaxis: SCD s  12. This plan has been discussed with Dr. Stanley    Patient specific identified risk factors considered as part of today s evaluation include: none      Additional history obtained from chart review.  Time spent on consultation: 35 minutes, greater than 50% spent on counseling and/or coordination of care.    Ella Buchanan (Hermes), PACabreraC  Colorectal Physician Assistant    [facility]  [address]  [place] [address]  T: [phone]  F: [phone]

## 2021-04-30 NOTE — H&P
History and Physical - Hospitalist Service       Date of Admission:  4/30/2021    Assessment & Plan      Juliana Uribe is a 55 year old female admitted on 4/30/2021. She presents with abdominal pain and melena.       Perforation of colon as colonoscopy complication (H)    Assessment: Presents with right lower quadrant abdominal pain following a colonoscopy with a polyp was removed.  CT abdomen shows free air adjacent to the ascending colon and also in the right upper quadrant near the liver, representing ascending colon perforation.  She is otherwise hemodynamically stable.  Colorectal surgery was consulted, which point conservative management was recommended at this time.    Plan:   -Admit inpatient  -IV Zosyn  -IV fluids  -N.p.o.  -Pain control as needed  -Antiemetics as needed  -Follow vitals/temp  -Colorectal surgery consultation      Migraine headache    Assessment/Plan: Treat symptomatically as needed       Diet:   NPO  DVT Prophylaxis: Pneumatic Compression Devices  Ortega Catheter: not present  Code Status:   FULL CODE         Disposition Plan   Expected discharge: 2 - 3 days, recommended to prior living arrangement once adequate pain management/ tolerating PO medications and antibiotic plan established.  Entered: Chino Mulligan MD 04/30/2021, 12:03 PM     The patient's care was discussed with the Patient and ED Provider.    Chino Mulligan MD    Contact information available via [facility] Paging/Directory      ______________________________________________________________________    Chief Complaint     Abdominal Pain    History is obtained from the patient    History of Present Illness   Juliana Uribe is a 55 year old female with past medical history of migraines who presents for evaluation of abdominal pain.    Patient had a colonoscopy today prior to admission with a polypectomy procedure performed.  She reports that upon getting  home, she developed onset of significant right-sided lower quadrant pain that was uncontrolled with her pain medications at home.  On the morning admission she had a black tarry stool with her bowel movement at which point she called her gastroenterologist.  She reports that her pain became severe with any movement/coughing/laughing.  She denies any chest pain or shortness breath, she has no fevers.  She is not on any anticoagulation, no active chronic steroid use.  She otherwise denies any blood in her urine, he no urinary urgency or frequency.  She denies any diarrhea.  She denies any nausea/vomiting.  At this time she has no other complaints.    Review of Systems    The 10 point Review of Systems is negative other than noted in the HPI or here.     Past Medical History    I have reviewed this patient's medical history and updated it with pertinent information if needed.   Past Medical History:   Diagnosis Date     Bilateral tinnitus        Past Surgical History   I have reviewed this patient's surgical history and updated it with pertinent information if needed.  Past Surgical History:   Procedure Laterality Date     ANGIOPLASTY Left     of left carotid after jaw surgery      COLONOSCOPY N/A 2021    Procedure: COLONOSCOPY, WITH POLYPECTOMY AND BIOPSY;  Surgeon: Helder Aguayo MD;  Location:  GI     [facility]         Social History   I have reviewed this patient's social history and updated it with pertinent information if needed.  Social History     Tobacco Use     Smoking status: Former Smoker     Quit date: 1997     Years since quittin.3     Smokeless tobacco: Never Used   Substance Use Topics     Alcohol use: Yes     Comment: 3 or 4 a day      Drug use: Never       Family History   I have reviewed this patient's family history and updated it with pertinent information if needed.  Family History   Problem Relation Age of Onset     No Known Problems Mother      No Known Problems Father       Prior to Admission Medications   Prior to Admission Medications   Prescriptions Last Dose Informant Patient Reported? Taking?   Cholecalciferol (VITAMIN D) 1000 UNITS capsule 4/29/2021 at Unknown time  Yes Yes   Sig: Take 1 capsule by mouth 2 times daily.   Multiple Minerals TABS 4/29/2021 at Unknown time  Yes Yes   Sig: Take  by mouth daily.   Turmeric Curcumin 500 MG CAPS 4/29/2021 at Unknown time  Yes Yes   Sig: Take  by mouth daily.   UNABLE TO FIND 4/30/2021 at Unknown time  Yes Yes   Sig: Take 1-2 Application by mouth daily as needed MEDICATION NAME: Milk thistle tincture   UNABLE TO FIND 4/29/2021 at Unknown time  Yes Yes   Sig: Place 1 tablet under the tongue At Bedtime MEDICATION NAME: Bio-identical hormones progesterone-estrogen SL tablets   co-enzyme Q-10 100 MG CAPS capsule 4/29/2021 at Unknown time  Yes Yes   Sig: Take 100 mg by mouth daily   melatonin (MELATONIN) 1 MG/ML LIQD liquid Past Week at Unknown time  Yes Yes   Sig: Take 1-5 mg by mouth nightly as needed for sleep   omega-3 fatty acids (FISH OIL) 1200 MG capsule 4/29/2021 at Unknown time  Yes Yes   Sig: Take 1 capsule by mouth 2 times daily.      Facility-Administered Medications: None     Allergies   No Known Allergies    Physical Exam   Vital Signs: Temp: 97.6  F (36.4  C) Temp src: Oral BP: 124/73 Pulse: 71   Resp: 16 SpO2: 99 % O2 Device: None (Room air)    Weight: 125 lbs 0 oz    Constitutional: awake, alert, cooperative, no apparent distress.   Eyes: Lids and lashes normal, pupils equal, round and reactive to light   ENT: Normocephalic, without obvious abnormality, atraumatic, sinuses nontender on palpation   Hematologic / Lymphatic: no cervical lymphadenopathy   Respiratory: CTABL   Cardiovascular: RRR with no m/r/g   GI: Normal bowel sounds, soft, non-distended, non-tender.   Skin: normal skin color, texture, turgor   Musculoskeletal: There is no redness, warmth, or swelling of the joints. Full range of motion noted.   Neurologic:  Awake, alert, oriented to name, place and time. Cranial nerves II-XII are grossly intact. Motor is 5 out of 5 bilaterally. Sensory is intact.   Neuropsychiatric: normal mood and affect    Data   Data reviewed today: I reviewed all medications, new labs and imaging results over the last 24 hours. I personally reviewed the abdominal CT image(s) showing see below.    IMPRESSION:   1.  Free air adjacent to the ascending colon and also in the right  upper quadrant near the liver, representing ascending colon  perforation. No free fluid or fluid collections.  2.  Mild wall thickening of the ascending colon is not well assessed.       Most Recent 3 CBC's:  Recent Labs   Lab Test 04/30/21  0859 02/05/19  1556   WBC 11.1* 2.9*   HGB 12.7 12.9   MCV 97 94    216     Most Recent 3 BMP's:  Recent Labs   Lab Test 04/30/21  0859 02/05/19  1556    139   POTASSIUM 4.0 3.5   CHLORIDE 107 105   CO2 26 29   BUN 11 10   CR 0.64 0.65   ANIONGAP 4 5   ZACHARY 8.5 8.9   GLC 77 75     Most Recent 2 LFT's:  Recent Labs   Lab Test 04/30/21  0859 02/05/19  1556   AST 35 34   ALT 33 35   ALKPHOS 46 54   BILITOTAL 0.7 0.3     Most Recent 3 INR's:No lab results found.  Recent Results (from the past 24 hour(s))   CT Abdomen Pelvis w Contrast    Narrative    CT ABDOMEN PELVIS W CONTRAST 4/30/2021 9:43 AM    CLINICAL HISTORY: RLQ abdominal pain; ascending colon bx right sided  pain since yesterday    TECHNIQUE: CT scan of the abdomen and pelvis was performed following  injection of IV contrast. Multiplanar reformats were obtained. Dose  reduction techniques were used.  CONTRAST: 63 mL Isovue-370    COMPARISON: None.    FINDINGS:   LOWER CHEST: Normal.    HEPATOBILIARY: Normal.    PANCREAS: Normal.    SPLEEN: Normal.    ADRENAL GLANDS: Normal.    KIDNEYS/BLADDER: Normal.    BOWEL: Normal stomach. Normal caliber of the small bowel. The appendix  is not discretely identified. There is free air adjacent to the  ascending colon and also in the  right upper quadrant near the liver.  No fluid collections. Normal caliber of the colon. There is mild wall  thickening of the ascending colon which is not well assessed.    PELVIC ORGANS: Normal.    ADDITIONAL FINDINGS: None.    MUSCULOSKELETAL: Minimal lateral curvature of the spine.      Impression    IMPRESSION:   1.  Free air adjacent to the ascending colon and also in the right  upper quadrant near the liver, representing ascending colon  perforation. No free fluid or fluid collections.  2.  Mild wall thickening of the ascending colon is not well assessed.    Discussed with Dr. Smith by Dr. Fahrner over telephone at 10:08 AM    LESTER J FAHRNER, MD

## 2021-04-30 NOTE — ED TRIAGE NOTES
Colonoscopy done yesterday with polyps removed, today having worsening abdominal pain with black tarry stool.

## 2021-04-30 NOTE — PROGRESS NOTES
RECEIVING UNIT ED HANDOFF REVIEW    ED Nurse Handoff Report was reviewed by: Pravin Gurrola RN on April 30, 2021 at 1:37 PM

## 2021-05-01 LAB
ANION GAP SERPL CALCULATED.3IONS-SCNC: 8 MMOL/L (ref 3–14)
BUN SERPL-MCNC: 9 MG/DL (ref 7–30)
CALCIUM SERPL-MCNC: 8 MG/DL (ref 8.5–10.1)
CHLORIDE SERPL-SCNC: 111 MMOL/L (ref 94–109)
CO2 SERPL-SCNC: 22 MMOL/L (ref 20–32)
CREAT SERPL-MCNC: 0.73 MG/DL (ref 0.52–1.04)
ERYTHROCYTE [DISTWIDTH] IN BLOOD BY AUTOMATED COUNT: 11.8 % (ref 10–15)
GFR SERPL CREATININE-BSD FRML MDRD: >90 ML/MIN/{1.73_M2}
GLUCOSE SERPL-MCNC: 53 MG/DL (ref 70–99)
HCT VFR BLD AUTO: 33.4 % (ref 35–47)
HGB BLD-MCNC: 11 G/DL (ref 11.7–15.7)
MCH RBC QN AUTO: 32.4 PG (ref 26.5–33)
MCHC RBC AUTO-ENTMCNC: 32.9 G/DL (ref 31.5–36.5)
MCV RBC AUTO: 99 FL (ref 78–100)
PLATELET # BLD AUTO: 193 10E9/L (ref 150–450)
POTASSIUM SERPL-SCNC: 3.7 MMOL/L (ref 3.4–5.3)
RBC # BLD AUTO: 3.39 10E12/L (ref 3.8–5.2)
SODIUM SERPL-SCNC: 141 MMOL/L (ref 133–144)
WBC # BLD AUTO: 8.1 10E9/L (ref 4–11)

## 2021-05-01 PROCEDURE — 36415 COLL VENOUS BLD VENIPUNCTURE: CPT | Performed by: STUDENT IN AN ORGANIZED HEALTH CARE EDUCATION/TRAINING PROGRAM

## 2021-05-01 PROCEDURE — 85027 COMPLETE CBC AUTOMATED: CPT | Performed by: STUDENT IN AN ORGANIZED HEALTH CARE EDUCATION/TRAINING PROGRAM

## 2021-05-01 PROCEDURE — 250N000011 HC RX IP 250 OP 636: Performed by: STUDENT IN AN ORGANIZED HEALTH CARE EDUCATION/TRAINING PROGRAM

## 2021-05-01 PROCEDURE — 120N000001 HC R&B MED SURG/OB

## 2021-05-01 PROCEDURE — 80048 BASIC METABOLIC PNL TOTAL CA: CPT | Performed by: STUDENT IN AN ORGANIZED HEALTH CARE EDUCATION/TRAINING PROGRAM

## 2021-05-01 PROCEDURE — 99232 SBSQ HOSP IP/OBS MODERATE 35: CPT | Performed by: INTERNAL MEDICINE

## 2021-05-01 PROCEDURE — 258N000003 HC RX IP 258 OP 636: Performed by: STUDENT IN AN ORGANIZED HEALTH CARE EDUCATION/TRAINING PROGRAM

## 2021-05-01 RX ADMIN — PIPERACILLIN SODIUM AND TAZOBACTAM SODIUM 3.38 G: 3; .375 INJECTION, POWDER, LYOPHILIZED, FOR SOLUTION INTRAVENOUS at 11:32

## 2021-05-01 RX ADMIN — SODIUM CHLORIDE, POTASSIUM CHLORIDE, SODIUM LACTATE AND CALCIUM CHLORIDE: 600; 310; 30; 20 INJECTION, SOLUTION INTRAVENOUS at 03:41

## 2021-05-01 RX ADMIN — PIPERACILLIN SODIUM AND TAZOBACTAM SODIUM 3.38 G: 3; .375 INJECTION, POWDER, LYOPHILIZED, FOR SOLUTION INTRAVENOUS at 17:48

## 2021-05-01 RX ADMIN — PIPERACILLIN SODIUM AND TAZOBACTAM SODIUM 3.38 G: 3; .375 INJECTION, POWDER, LYOPHILIZED, FOR SOLUTION INTRAVENOUS at 05:00

## 2021-05-01 ASSESSMENT — ACTIVITIES OF DAILY LIVING (ADL)
ADLS_ACUITY_SCORE: 12

## 2021-05-01 NOTE — PLAN OF CARE
Pt up IND, walks frequently. Tolerating diet, denies pain&nausea. Patient c/o swelling in LE and mild increase in abdominal bloating from yesterday. Patient personally called her provider. Writer paged hospitalist, no new orders at this time, encouraged patient to take diet slowly.

## 2021-05-01 NOTE — PROVIDER NOTIFICATION
Paged on call hospitalist to see if pt could get home melatonin and progesterone reordered per pt request.     Meds reordered

## 2021-05-01 NOTE — PLAN OF CARE
[id number]: A&O x4. VSS on RA. Patient declining pain medication, abdomen is tender. Slight bloating. LR @ 100. CMS intact. LS clear. BS hyperactive, BM-, flatus+. Tolerating NPO. Denies N/V. Voiding adequately. Up independently.     Patient transferred to station 55 due to increased anxiety with roommate.

## 2021-05-01 NOTE — PROGRESS NOTES
Colorectal Surgery Progress Note    HD 2    Subjective:     - Overnight, did not sleep well due to neighbor and beeping from IV pole  - Wants to go home today  - Says pain is much improved  - Denies any nausea/vomiting  - No gas/bowel movements yet      Physical Exam:    Afebrile, VSS  No acute distress, sitting up in bed  Grossly alert and oriented  Breathing comfortably on room air  Abdomen is soft, non-distended. Non-tender. No evidence of peritonitis  Moving extremities spontaneously. Warm and well-perfused    WBC 11.1 --> 8.1    ASSESSMENT/PLAN:     Juliana Uribe is a 55 year old female who underwent a colonoscopy on 4/29/21, including removal of an ascending colon polyp with lift and hot snare, then developed worsening abdominal pain that evening. Sent to ED where CT scan showed perforation of ascending colon, with retroperitoneal air, and no fluid collections. Admitted for post-polypectomy syndrome.    Clinically, she is doing well and improving. No fevers, normal WBC, and non-tender on exam.    I had a thorough discussion with Juliana this morning about her course. I understand that she feels better, and wants to leave the hospital. However, a perforation in the colon can worsen if we rush things, and can lead to life-threatening intraabdominal sepsis. My recommendation is for starting a clear liquid diet today (and allowing some coffee), and advancing to a solid diet tomorrow, with discharge tomorrow (or later) if she does OK. Advancing her diet while in the hospital is the safest course, in case she suddenly worsens and needs emergency laparotomy. If she were home or out in the community, we just wouldn't be able to intervene on her as quickly if she does have a free perforation. She really wants to go home today despite the risk that I discussed with her.     Recommendations:    - Clear liquid diet now, and saline lock (orders placed)  - Full liquid diet later today if she tolerated her clears  - Low  fiber tomorrow, and discharge tomorrow if she does well  - Continue antibiotics (IV) in meantime, and switch to orals at discharge  - OK to walk outside while inpatient  - If Juliana insists on going home today, please page me        Bhavik Navarrete MD ..................5/1/2021   9:31 AM  Fellow  Colon and Rectal Surgery

## 2021-05-01 NOTE — PROGRESS NOTES
[facility]    Medicine Progress Note - Hospitalist Service       Date of Admission:  4/30/2021  Assessment & Plan   Juliana Uribe is a 55 year-old female with history of headaches who presents with abdominal pain and melena following colonoscopy with polypectomy. Admitted on 4/30/2021.      Perforation of colon as colonoscopy complication  Presents with right lower quadrant abdominal pain following a colonoscopy with polypectomy. CT abdomen shows free air adjacent to the ascending colon and also in the right upper quadrant near the liver, consistent ascending colon perforation.  Hemodynamically stable.   - Colorectal surgery consulted, conservative management recommended  - Afebrile, WBC normal   - Continue piperacillin-tazobactam IV   - Diet advanced to clear liquids      Headache  - Currently reports frontal headache, attributes it to poor sleep, caffeine withdrawal  - Offered caffeine in pill form, she declines  - Symptomatic management     Asymptomatic COVID19 PCR negative     Diet: Clear Liquid Diet    DVT Prophylaxis: Pneumatic Compression Devices  Ortega Catheter: not present  Code Status: Full Code      Disposition Plan   Expected discharge: Anticipate discharge tomorrow pending advancement of diet, CRS clearance  Entered: Amarjit Otero MD 05/01/2021, 7:57 AM       The patient's care was discussed with the patient, patient's SO, bedside RN     Amarjit Otero MD  Hospitalist Service  [facility]    ______________________________________________________________________    Interval History   No acute events overnight. Had a very poor night of sleep due to roommate, beeping IV. Reports bifrontal headache which she attributes to poor sleep, dehydration. Denies abdominal pain, n/v.      Data reviewed today: I reviewed all medications, new labs and imaging results over the last 24 hours. I personally reviewed no images or EKG's today.    Physical Exam    Vital Signs: Temp: 97.4  F (36.3  C) Temp src: Oral BP: 118/75 Pulse: 68   Resp: 20 SpO2: 100 % O2 Device: None (Room air)    Weight: 125 lbs 0 oz    Constitutional: Well-appearing, NAD  Respiratory: Breathing comfortably on room air, normal effort at rest  Cardiovascular:    GI: Soft, non-tender, non-distended.    Skin/Integumen: Warm, dry  Other:     Data   Recent Labs   Lab 05/01/21  0715 04/30/21  0859   WBC 8.1 11.1*   HGB 11.0* 12.7   MCV 99 97    199    137   POTASSIUM 3.7 4.0   CHLORIDE 111* 107   CO2 22 26   BUN 9 11   CR 0.73 0.64   ANIONGAP 8 4   ZACHARY 8.0* 8.5   GLC 53* 77   ALBUMIN  --  3.4   PROTTOTAL  --  6.9   BILITOTAL  --  0.7   ALKPHOS  --  46   ALT  --  33   AST  --  35       Recent Results (from the past 24 hour(s))   CT Abdomen Pelvis w Contrast    Narrative    CT ABDOMEN PELVIS W CONTRAST 4/30/2021 9:43 AM    CLINICAL HISTORY: RLQ abdominal pain; ascending colon bx right sided  pain since yesterday    TECHNIQUE: CT scan of the abdomen and pelvis was performed following  injection of IV contrast. Multiplanar reformats were obtained. Dose  reduction techniques were used.  CONTRAST: 63 mL Isovue-370    COMPARISON: None.    FINDINGS:   LOWER CHEST: Normal.    HEPATOBILIARY: Normal.    PANCREAS: Normal.    SPLEEN: Normal.    ADRENAL GLANDS: Normal.    KIDNEYS/BLADDER: Normal.    BOWEL: Normal stomach. Normal caliber of the small bowel. The appendix  is not discretely identified. There is free air adjacent to the  ascending colon and also in the right upper quadrant near the liver.  No fluid collections. Normal caliber of the colon. There is mild wall  thickening of the ascending colon which is not well assessed.    PELVIC ORGANS: Normal.    ADDITIONAL FINDINGS: None.    MUSCULOSKELETAL: Minimal lateral curvature of the spine.      Impression    IMPRESSION:   1.  Free air adjacent to the ascending colon and also in the right  upper quadrant near the liver, representing ascending  colon  perforation. No free fluid or fluid collections.  2.  Mild wall thickening of the ascending colon is not well assessed.    Discussed with Dr. Smith by Dr. Fahrner over telephone at 10:08 AM    LESTER J FAHRNER, MD     Medications     lactated ringers 100 mL/hr at 05/01/21 0341       BI-EST (50/50) 1.25 MG/P4 100 SHAW--patients own supply  1 Shaw Oral At Bedtime     piperacillin-tazobactam  3.375 g Intravenous Q6H     sodium chloride (PF)  3 mL Intracatheter Q8H

## 2021-05-01 NOTE — PLAN OF CARE
Date/Time 4/30/2021 [id number]    Trauma/Ortho/Medical (Choose one) Medical    Diagnosis: Perforation of colon as colonoscopy complication  POD#: NA  Mental Status: A & O x 4  Activity/dangle: Independent  Diet: NPO  Pain: Tylenol given x 1  Ortega/Voiding: Voiding in BR  Tele/Restraints/Iso: NA  02/LDA: PIV infusing LR at 100 mL/h  D/C Date: Per MD note, expected to discharge in 2-3 days once adequate pain management, tolerating PO medications, and ABX plan established.  Other Info: Denies nausea. Abdomen is tender. Slight bloating. No BM, +flatus. CMS intact.

## 2021-05-01 NOTE — PROGRESS NOTES
"GI    S: Wants to go home. She slept poorly last night; her roommate was having trouble breathing ... the sights and sounds of being in the hospital were overwhelming. Around midnight, they put her into a private room, but she still slept poorly.     She is \"hangry\", feels irritable and poorly because her body wants to eat. No abdominal pain. Now able to sit up from a lying to a seated position without abdominal discomfort. No fever or shaking chills. No having a BM.    She asked multiple questions about the procedure, the current recommendations, things she heard from colon and rectal and the plan going forward; I answered her questions.    O: Seated comfortably in a chair in street clothes taking to her sister on the telephone. She looks healthy, without signs of distress. Moves from chair to bed with normal, fluid body kinetics.  VS: T98.1, P76, R 20, /48 O2 sat 97%  HENT: anicteric. Moist mucus membranes.   Lungs: clear to auscultation bilaterally.   Cor: RRR with normal heart tones.  Abd: Symmetric, soft. Non tender. Active bowel sounds. No evidence of peritonitis.     Labs: GNEC essentially normal. Glucose 53. WBC has normalized at 8.1. Hb 11.0.    Path: Sessile serrated adenoma. I discussed the results of the pathology with her and explained what it means in terms of her risk for colon cancer and need for future surveillance.     Assess: Post polypectomy bleed and localized retroperitoneal perforation. She is doing remarkably well with medical management; no fevers, shaking chills, or signs of peritonitis. We're now more than 48 hours from the procedure and this may heal without the need for surgery. No evidence of  Further GI bleeding and Hb is reasonable at this point.    Plan: Although she wants to go home, I believe the best course of action is to remain in the hospital and monitor her while we reintroduce her digestive system to oral alimentation. I suggested we begin with clear liquids, and if " that is tolerated, move on to full liquids. I would wait until tomorrow to introduce solid foods; if she does well with that, then I think she can go home tomorrow on oral antibiotics and follow up in clinic with her primary caregiver next week.     For next ten days; no vigorous exercise/lifting weights. Aerobic exercise is okay. Advancing to a high fiber diet is okay, but avoid intake of high roughage foods ... e.g. ... raw vegetables like carrots and celery, no popcorn.    Helder Aguayo MD  [phone]

## 2021-05-02 VITALS
DIASTOLIC BLOOD PRESSURE: 77 MMHG | SYSTOLIC BLOOD PRESSURE: 129 MMHG | OXYGEN SATURATION: 100 % | TEMPERATURE: 97.5 F | HEART RATE: 60 BPM | RESPIRATION RATE: 18 BRPM | WEIGHT: 135.6 LBS | BODY MASS INDEX: 21.56 KG/M2

## 2021-05-02 PROCEDURE — 250N000011 HC RX IP 250 OP 636: Performed by: STUDENT IN AN ORGANIZED HEALTH CARE EDUCATION/TRAINING PROGRAM

## 2021-05-02 PROCEDURE — 99239 HOSP IP/OBS DSCHRG MGMT >30: CPT | Performed by: INTERNAL MEDICINE

## 2021-05-02 RX ADMIN — PIPERACILLIN SODIUM AND TAZOBACTAM SODIUM 3.38 G: 3; .375 INJECTION, POWDER, LYOPHILIZED, FOR SOLUTION INTRAVENOUS at 11:36

## 2021-05-02 RX ADMIN — PIPERACILLIN SODIUM AND TAZOBACTAM SODIUM 3.38 G: 3; .375 INJECTION, POWDER, LYOPHILIZED, FOR SOLUTION INTRAVENOUS at 05:04

## 2021-05-02 ASSESSMENT — ACTIVITIES OF DAILY LIVING (ADL)
ADLS_ACUITY_SCORE: 12

## 2021-05-02 NOTE — DISCHARGE SUMMARY
[facility]  Hospitalist Discharge Summary       Date of Admission:  4/30/2021  Date of Discharge:  5/2/2021  Discharging Provider: Amarjit Otero MD      Discharge Diagnoses   Perforation of colon as colonoscopy complication  Headache    Follow-ups Needed After Discharge   Follow-up Appointments     Follow-up and recommended labs and tests       Follow up with primary care provider, Joaquín Fitzpatrick MD, in   7-10 days for hospital follow- up.  The following labs/tests are   recommended: CBC.               Hospital Course   Juliana Uribe is a 55 year-old female with history of headaches who presents with abdominal pain and melena following colonoscopy with polypectomy. Admitted on 4/30/2021.      Perforation of colon as colonoscopy complication  Presents with right lower quadrant abdominal pain following a colonoscopy with polypectomy. CT abdomen showed free air adjacent to the ascending colon and also in the right upper quadrant near the liver, consistent ascending colon perforation.  Hemodynamically stable.   - Colorectal surgery consulted, conservative management recommended  - Remained afebrile, WBC normal   - Treated with piperacillin-tazobactam IV, bowel rest  - Tolerated advancement of diet  - Discharged with 5 day course of amoxicillin-clavulanate PO   - Discussed probiotics on discharge  - Discussed with GI on day of discharge     Headache  Reported frontal headache, attributes it to poor sleep, caffeine withdrawal  - Symptomatic management     Asymptomatic COVID19 PCR negative     Consultations This Hospital Stay   COLORECTAL SURGERY IP CONSULT    Code Status   Full Code    Time Spent on this Encounter   I, Amarjit Otero MD, personally saw the patient today and spent greater than 30 minutes discharging this patient.       Amarjit Otero MD  [facility]  ______________________________________________________________________    Physical  Exam   Vital Signs: Temp: 97.5  F (36.4  C) Temp src: Oral BP: 129/77 Pulse: 60   Resp: 18 SpO2: 100 % O2 Device: None (Room air)    Weight: 135 lbs 9.6 oz    Constitutional: Well-appearing, NAD  Respiratory: Clear to auscultation bilaterally, good air movement bilaterally  Cardiovascular: RRR, no m/r/g. No peripheral edema.  GI: Soft, non-tender, non-distended.   Skin/Integumen: Warm, dry  Other:         Primary Care Physician   Joaquín Fitzpatrick MD    Discharge Disposition   Discharged to home  Condition at discharge: Stable      Discharge Orders      Reason for your hospital stay    You were hospitalized for a perforation of your colon after polyp removal.     Activity    Your activity upon discharge: Aerobic exercise is okay, but avoid engaging in high intensity exercise or weight lifting until after May 10; ease back into it at that time.     Discharge Instructions    Avoid aspirin and NSAIDs; acetaminophen (Tylenol) is okay.     Follow-up and recommended labs and tests     Follow up with primary care provider, Joaquín Fitzpatrick MD, in 7-10 days for hospital follow- up.  The following labs/tests are recommended: CBC.     When to contact your care team    Notify Dr. Aguayo if you develop fever, shaking chills, abdominal pain or red blood in the stool; anticipate there may be dark stool with your next bowel movement.     Diet    Follow this diet upon discharge: Advance diet slowly as tolerated; initially choose foods that are easily digestible (soft foods and pastas) and avoid eating high roughage foods like raw carrots and popcorn.     Discharge Medications   Current Discharge Medication List      START taking these medications    Details   amoxicillin-clavulanate (AUGMENTIN) 875-125 MG tablet Take 1 tablet by mouth 2 times daily for 5 days  Qty: 10 tablet, Refills: 0    Associated Diagnoses: Perforation of colon as colonoscopy complication (H)         CONTINUE these medications which have NOT  CHANGED    Details   Cholecalciferol (VITAMIN D) 1000 UNITS capsule Take 1 capsule by mouth 2 times daily.      co-enzyme Q-10 100 MG CAPS capsule Take 100 mg by mouth daily      Estriol-Estradiol (BI-EST 50:50 TD) Place 1 Cecilio onto the skin At Bedtime BI-EST (50/50) 1.25 MG/P4 100 CECILIO--patients own supply compounded      melatonin (MELATONIN) 1 MG/ML LIQD liquid Take 1-5 mg by mouth nightly as needed for sleep      Multiple Minerals TABS Take  by mouth daily.      omega-3 fatty acids (FISH OIL) 1200 MG capsule Take 1 capsule by mouth 2 times daily.      Turmeric Curcumin 500 MG CAPS Take  by mouth daily.      UNABLE TO FIND Take 1-2 Application by mouth daily as needed MEDICATION NAME: Milk thistle tincture             Significant Results and Procedures   Most Recent 3 CBC's:  Recent Labs   Lab Test 05/01/21  0715 04/30/21  0859 02/05/19  1556   WBC 8.1 11.1* 2.9*   HGB 11.0* 12.7 12.9   MCV 99 97 94    199 216     Most Recent 3 BMP's:  Recent Labs   Lab Test 05/01/21  0715 04/30/21  0859 02/05/19  1556    137 139   POTASSIUM 3.7 4.0 3.5   CHLORIDE 111* 107 105   CO2 22 26 29   BUN 9 11 10   CR 0.73 0.64 0.65   ANIONGAP 8 4 5   ZACHAYR 8.0* 8.5 8.9   GLC 53* 77 75     Most Recent 2 LFT's:  Recent Labs   Lab Test 04/30/21  0859 02/05/19  1556   AST 35 34   ALT 33 35   ALKPHOS 46 54   BILITOTAL 0.7 0.3     Most Recent 3 INR's:No lab results found.  Most Recent 3 Troponin's:No lab results found.  Most Recent 3 BNP's:No lab results found.  Most Recent Cholesterol Panel:No lab results found.  Most Recent 6 Bacteria Isolates From Any Culture (See EPIC Reports for Culture Details):No lab results found.  Most Recent TSH and T4:No lab results found.  Most Recent Hemoglobin A1c:No lab results found.  Most Recent Urinalysis:  Recent Labs   Lab Test 02/05/19  1513   COLOR Light Yellow   APPEARANCE Clear   URINEGLC Negative   URINEBILI Negative   URINEKETONE Negative   SG 1.005   UBLD Negative   URINEPH 6.0    PROTEIN Negative   NITRITE Negative   LEUKEST Negative     Most Recent ABG:No lab results found.  Most Recent ESR & CRP:No lab results found.,   Results for orders placed or performed during the hospital encounter of 04/30/21   CT Abdomen Pelvis w Contrast    Narrative    CT ABDOMEN PELVIS W CONTRAST 4/30/2021 9:43 AM    CLINICAL HISTORY: RLQ abdominal pain; ascending colon bx right sided  pain since yesterday    TECHNIQUE: CT scan of the abdomen and pelvis was performed following  injection of IV contrast. Multiplanar reformats were obtained. Dose  reduction techniques were used.  CONTRAST: 63 mL Isovue-370    COMPARISON: None.    FINDINGS:   LOWER CHEST: Normal.    HEPATOBILIARY: Normal.    PANCREAS: Normal.    SPLEEN: Normal.    ADRENAL GLANDS: Normal.    KIDNEYS/BLADDER: Normal.    BOWEL: Normal stomach. Normal caliber of the small bowel. The appendix  is not discretely identified. There is free air adjacent to the  ascending colon and also in the right upper quadrant near the liver.  No fluid collections. Normal caliber of the colon. There is mild wall  thickening of the ascending colon which is not well assessed.    PELVIC ORGANS: Normal.    ADDITIONAL FINDINGS: None.    MUSCULOSKELETAL: Minimal lateral curvature of the spine.      Impression    IMPRESSION:   1.  Free air adjacent to the ascending colon and also in the right  upper quadrant near the liver, representing ascending colon  perforation. No free fluid or fluid collections.  2.  Mild wall thickening of the ascending colon is not well assessed.    Discussed with Dr. Smith by Dr. Fahrner over telephone at 10:08 AM    LESTER J FAHRNER, MD       Allergies   No Known Allergies

## 2021-05-02 NOTE — PROGRESS NOTES
GI Progress Note    S: She has no pain, maybe a little residual tenderness. Tolerated eggs and a muffin this morning. She expressed concern about swelling of her legs; she gained several pounds of weight since being in the hospital. She denies leg/calf pain or tenderness.     She inquired about probiotic supplements while taking antibiotics.     O: She is seated in a chair, dressed in street clothes, doing a search on the Internet regarding antibiotics and probiotics. No signs of distress; easy conversational tone. Moves easily from the chair to the bed and back.   VS: T 97.5, /77, P 60, RR 18  Lungs: clear to auscultation, no respiratory distress.    Cor: RRR normal heart tones.  Abd: symmetric, soft and non tender. Active bowel sounds. No peritoneal signs.   Extrem: No clinically significant edema. There is no dyssymmetry to the appearance of lower legs.     Labs: none new.     Assess: s/p localized, walled off colon perforation post polypectomy. She is doing well with conservative medical management. There is no indication for surgery at this time. The lower extremity edema she was concerned about is probably related to weight gain from IV fluids.     Recommendations:  1. Advance diet slowly as tolerated; initially choose foods that are easily digestible (soft foods and pastas) and avoid eating high roughage foods like raw carrots and popcorn.    2. Avoid aspirin and NSAIDs; tylenol is okay.  3. Aerobic exercise is okay, but avoid engaging in high intensity exercise or weight lifting until after May 10; ease back into it at that time.   4. Notify me if she develops fever, shaking chills, abdominal pain or red blood in the stool; anticipate there may be dark stool with the next bowel movement.   5. Augmentin PO for 5 days.    6. The role of probiotics is uncertain; however, Florastor (Sacchyromyces boulardii) may prevent antibiotic associated diarrhea and C. difficile.  7. Okay to discharge to home  today.  8.Follow up in clinic with primary care provider in 7-10 days for check up and CBC.     Helder Aguayo MD  [phone]

## 2021-05-02 NOTE — PLAN OF CARE
Pt is AOx4. VSS on RA. Independent; walks frequently. Full liquid diet; tolerating. PIV; SL. Denies pain. Minor swelling in R leg; pt is concerned. Provider is aware. Pt refused 2300 antibiotic; didn't want to be waken up. 0500 antibiotic given. Pt slept better than night before. Discharge anticipated today per MD note; advance diet.

## 2021-05-02 NOTE — PROGRESS NOTES
Colorectal Surgery Progress Note    HD 4    Subjective:     - No acute events overnight  - Slept better  - Denies any pain  - Passing gas, no BM yet  - No nausea, vomiting, or pain with her liquid diet  - Concerned about her swollen extremities    Physical Exam:    Afebrile, VSS  No acute distress, already changed to home clothes, pacing the room  Grossly alert and oriented  Breathing comfortably on room air  Abdomen is soft, non-distended. Non-tender. No evidence of peritonitis  Moving extremities spontaneously. Warm and well-perfused    WBC 11.1 --> 8.1    ASSESSMENT/PLAN:     Juliana Uribe is a 55 year old female who underwent a colonoscopy on 4/29/21, including removal of an ascending colon polyp with lift and hot snare, then developed worsening abdominal pain that evening. Sent to ED where CT scan showed perforation of ascending colon, with retroperitoneal air, and no fluid collections. Admitted for post-polypectomy syndrome. She has remained clinically well, with virtually no pain, no fevers, and normal WBC. Benign exam.    She tolerated a full liquid diet.    I advanced her to a low fiber diet today, and if she does well with breakfast and lunch, can go home after lunch.    I reviewed carefully with her the warning signs that she would watch for at home, including fevers, worsening pain, and food intoleance.    I reassured her that the bloating/swelling is from IVF and it will clear with time.    I stressed that she should not engage in vigorous activities for at least two weeks, if not longer. Walking, showering, bathing are all OK.      Bhavik Navarrete MD  Colorectal Fellow  5/2/2021

## 2021-05-02 NOTE — PLAN OF CARE
Patient up IND. Tolerating advancement in diet, no complaints. Adequate output. Denies pain. Discharge AVS and medications reviewed with patient and significant other, all questions answered at this time. Patient discharged home with belongings.

## 2021-05-26 ENCOUNTER — HOSPITAL ENCOUNTER (OUTPATIENT)
Dept: BONE DENSITY | Facility: CLINIC | Age: 56
Discharge: HOME | End: 2021-05-26
Attending: INTERNAL MEDICINE | Admitting: INTERNAL MEDICINE
Payer: COMMERCIAL

## 2021-05-26 DIAGNOSIS — Z78.0 ASYMPTOMATIC MENOPAUSAL STATE: ICD-10-CM

## 2021-05-26 PROCEDURE — 77080 DXA BONE DENSITY AXIAL: CPT

## 2021-09-08 ENCOUNTER — TRANSCRIBE ORDERS (OUTPATIENT)
Dept: OBGYN | Facility: CLINIC | Age: 56
End: 2021-09-08

## 2021-09-08 DIAGNOSIS — Z12.11 ENCOUNTER FOR SCREENING COLONOSCOPY: Primary | ICD-10-CM

## 2021-09-09 ENCOUNTER — TELEPHONE (OUTPATIENT)
Dept: GASTROENTEROLOGY | Facility: CLINIC | Age: 56
End: 2021-09-09

## 2021-09-09 NOTE — TELEPHONE ENCOUNTER
Screening Questions  1. Are you active on mychart?    2. What insurance is in the chart? [employer]    2.  Ordering/Referring Provider: Joaquín Fitzpatrick MD    3. BMI 19.6    4. Are you on daily home oxygen? no    5. Do you have a history of difficult airway?  no    6. Have you had a heart, lung, or liver transplant?  no    7. Are you currently on dialysis? no    8. Have you had a stroke or Transient ischemic atttack (TIA) within 6 months?  no    9. In the past 6 months, have you had any heart related issues including cardiomyopathy or heart attack?         If yes, did it require cardiac stenting or other implantable device? no    10. Do you have any implantable devices in your body (pacemaker, defib, LVAD)?  No     11. Do you take nitroglycerin? If yes, how often?  no    12. Are you currently taking any blood thinners? No     13. Are you a diabetic? No     14. (Females) Are you currently pregnant?   If yes, how many weeks?    15. Have you had a procedure in the past that was difficult to tolerate with conscious sedation? Any allergies to Fentanyl or Versed  No     16. Are you taking any scheduled prescription narcotics more than once daily?  no    17. Do you have any chemical dependencies such as alcohol, street drugs, or methadone?  No     18. Do you have any history of post-traumatic stress syndrome or mental health issues?  No     19. Do you transfer independently?  Yes     20.  Do you have any issues with constipation? no    21. Preferred Pharmacy for Pre Prescription  [id number] [place], [place] - [address] AT  [address] [facility]    Scheduling Details    Which Colonoscopy Prep was Sent?: miralax  Procedure Scheduled: colon  Provider/Surgeon: Emeka  Date of Procedure: 12/2/2021  Location:   Caller (Please ask for phone number if not scheduled by patient): Juliana Uribe      Sedation Type: CS  Conscious Sedation- Needs  for 6 hours after the  procedure  MAC/General-Needs  for 24 hours after procedure    Pre-op Required at [facility], [place], [place] and OR for MAC sedation:   (if yes advise patient they will need a pre-op prior to procedure)      Is patient on blood thinners? -no (If yes- inform patient to follow up with PCP or provider for follow up instructions)     Informed patient they will need an adult  yes  Cannot take any type of public or medical transportation alone    Informed Patient of COVID Test Requirement yes    Confirmed Nurse will call to complete assessment yes    Additional comments:

## 2021-10-31 DIAGNOSIS — Z11.59 ENCOUNTER FOR SCREENING FOR OTHER VIRAL DISEASES: ICD-10-CM

## 2021-11-30 ENCOUNTER — LAB (OUTPATIENT)
Dept: LAB | Facility: CLINIC | Age: 56
End: 2021-11-30
Payer: COMMERCIAL

## 2021-11-30 DIAGNOSIS — Z11.59 ENCOUNTER FOR SCREENING FOR OTHER VIRAL DISEASES: ICD-10-CM

## 2021-11-30 LAB — SARS-COV-2 RNA RESP QL NAA+PROBE: NEGATIVE

## 2021-11-30 PROCEDURE — U0003 INFECTIOUS AGENT DETECTION BY NUCLEIC ACID (DNA OR RNA); SEVERE ACUTE RESPIRATORY SYNDROME CORONAVIRUS 2 (SARS-COV-2) (CORONAVIRUS DISEASE [COVID-19]), AMPLIFIED PROBE TECHNIQUE, MAKING USE OF HIGH THROUGHPUT TECHNOLOGIES AS DESCRIBED BY CMS-2020-01-R: HCPCS

## 2021-11-30 PROCEDURE — U0005 INFEC AGEN DETEC AMPLI PROBE: HCPCS

## 2021-12-02 ENCOUNTER — HOSPITAL ENCOUNTER (OUTPATIENT)
Facility: CLINIC | Age: 56
Discharge: HOME | End: 2021-12-02
Attending: INTERNAL MEDICINE | Admitting: INTERNAL MEDICINE
Payer: COMMERCIAL

## 2021-12-02 VITALS
DIASTOLIC BLOOD PRESSURE: 60 MMHG | OXYGEN SATURATION: 99 % | HEIGHT: 66 IN | RESPIRATION RATE: 10 BRPM | TEMPERATURE: 97.4 F | HEART RATE: 68 BPM | WEIGHT: 125 LBS | SYSTOLIC BLOOD PRESSURE: 98 MMHG | BODY MASS INDEX: 20.09 KG/M2

## 2021-12-02 LAB — COLONOSCOPY: NORMAL

## 2021-12-02 PROCEDURE — G0500 MOD SEDAT ENDO SERVICE >5YRS: HCPCS | Performed by: INTERNAL MEDICINE

## 2021-12-02 PROCEDURE — 258N000003 HC RX IP 258 OP 636: Performed by: INTERNAL MEDICINE

## 2021-12-02 PROCEDURE — 250N000011 HC RX IP 250 OP 636: Performed by: INTERNAL MEDICINE

## 2021-12-02 PROCEDURE — 45378 DIAGNOSTIC COLONOSCOPY: CPT | Performed by: INTERNAL MEDICINE

## 2021-12-02 PROCEDURE — G0105 COLORECTAL SCRN; HI RISK IND: HCPCS | Performed by: INTERNAL MEDICINE

## 2021-12-02 RX ORDER — ACAMPROSATE CALCIUM 333 MG/1
TABLET, DELAYED RELEASE ORAL
COMMUNITY
Start: 2021-11-02

## 2021-12-02 RX ORDER — FENTANYL CITRATE 50 UG/ML
INJECTION, SOLUTION INTRAMUSCULAR; INTRAVENOUS PRN
Status: COMPLETED | OUTPATIENT
Start: 2021-12-02 | End: 2021-12-02

## 2021-12-02 RX ORDER — SODIUM CHLORIDE 9 MG/ML
INJECTION, SOLUTION INTRAVENOUS CONTINUOUS PRN
Status: COMPLETED | OUTPATIENT
Start: 2021-12-02 | End: 2021-12-02

## 2021-12-02 RX ORDER — GABAPENTIN 100 MG/1
CAPSULE ORAL
COMMUNITY
Start: 2021-10-26

## 2021-12-02 RX ADMIN — SODIUM CHLORIDE 125 ML/HR: 9 INJECTION, SOLUTION INTRAVENOUS at 07:34

## 2021-12-02 RX ADMIN — FENTANYL CITRATE 100 MCG: 50 INJECTION, SOLUTION INTRAMUSCULAR; INTRAVENOUS at 08:06

## 2021-12-02 RX ADMIN — MIDAZOLAM 2 MG: 1 INJECTION INTRAMUSCULAR; INTRAVENOUS at 08:06

## 2021-12-02 RX ADMIN — MIDAZOLAM 2 MG: 1 INJECTION INTRAMUSCULAR; INTRAVENOUS at 08:08

## 2021-12-02 ASSESSMENT — MIFFLIN-ST. JEOR: SCORE: 1173.75

## 2021-12-02 NOTE — CONSULTS
[facility]  Pre-Endoscopy History and Physical     Juliana Uribe MRN# [id number]   YOB: 1965 Age: 56 year old     Date of Procedure: 12/2/2021  Primary care provider: Joaquín Fitzpatrick  Type of Endoscopy: colonoscopy  Reason for Procedure: H/O polyps  Type of Anesthesia Anticipated: Moderate Sedation    HPI:    Juliana is a 56 year old female who will be undergoing the above procedure.      A history and physical has been performed. The patient's medications and allergies have been reviewed. The risks and benefits of the procedure and the sedation options and risks were discussed with the patient.  All questions were answered and informed consent was obtained.      She denies a personal or family history of anesthesia complications or bleeding disorders.     No Known Allergies     Prior to Admission Medications   Prescriptions Last Dose Informant Patient Reported? Taking?   Cholecalciferol (VITAMIN D) 1000 UNITS capsule Past Week at Unknown time  Yes Yes   Sig: Take 1 capsule by mouth 2 times daily.   Estriol-Estradiol (BI-EST 50:50 TD) Past Week at Unknown time  Yes Yes   Sig: Place 1 Cecilio onto the skin At Bedtime BI-EST (50/50) 1.25 MG/P4 100 CECILIO--patients own supply compounded   Multiple Minerals TABS Past Week at Unknown time  Yes Yes   Sig: Take  by mouth daily.   Turmeric Curcumin 500 MG CAPS Past Week at Unknown time  Yes Yes   Sig: Take  by mouth daily.   UNABLE TO FIND Past Week at Unknown time  Yes Yes   Sig: Take 1-2 Application by mouth daily as needed MEDICATION NAME: Milk thistle tincture   acamprosate (CAMPRAL) 333 MG EC tablet 12/1/2021 at Unknown time  Yes Yes   Sig: TAKE 2 TABLETS BY MOUTH THREE TIMES DAILY FOR EXCESSIVE USE OF ALCOHOL   co-enzyme Q-10 100 MG CAPS capsule Past Week at Unknown time  Yes Yes   Sig: Take 100 mg by mouth daily   gabapentin (NEURONTIN) 100 MG capsule 12/1/2021 at Unknown time  Yes Yes   Sig: TAKE 2 CAPSULES BY  "MOUTH THREE TIMES DAILY   melatonin (MELATONIN) 1 MG/ML LIQD liquid Past Week at Unknown time  Yes Yes   Sig: Take 1-5 mg by mouth nightly as needed for sleep   omega-3 fatty acids (FISH OIL) 1200 MG capsule Past Week at Unknown time  Yes Yes   Sig: Take 1 capsule by mouth 2 times daily.      Facility-Administered Medications: None       Patient Active Problem List   Diagnosis     Allergic contact dermatitis     Dermatitis     Perforation of colon as colonoscopy complication (H)     Abnormal weight     Alcohol abuse     Menopausal flushing     Blastocystis hominis infection     Migraine headache     PMDD (premenstrual dysphoric disorder)     Rash and nonspecific skin eruption     Routine general medical examination at a health care facility     Vitamin D deficiency        Past Medical History:   Diagnosis Date     Bilateral tinnitus         Past Surgical History:   Procedure Laterality Date     ANGIOPLASTY Left     of left carotid after jaw surgery      COLONOSCOPY N/A 2021    Procedure: COLONOSCOPY, WITH POLYPECTOMY AND BIOPSY;  Surgeon: Helder Aguayo MD;  Location:  GI     MANDIBLE SURGERY         Social History     Tobacco Use     Smoking status: Former Smoker     Quit date: 1997     Years since quittin.9     Smokeless tobacco: Never Used   Substance Use Topics     Alcohol use: Yes     Comment: 3 or 4 a day        Family History   Problem Relation Age of Onset     No Known Problems Mother      No Known Problems Father        REVIEW OF SYSTEMS:     5 point ROS negative except as noted above in HPI, including Gen., Resp., CV, GI &  system review.      PHYSICAL EXAM:   BP 93/63   Pulse 62   Temp 97.4  F (36.3  C)   Resp 9   Ht 1.676 m (5' 6\")   Wt 56.7 kg (125 lb)   SpO2 100%   BMI 20.18 kg/m   Estimated body mass index is 20.18 kg/m  as calculated from the following:    Height as of this encounter: 1.676 m (5' 6\").    Weight as of this encounter: 56.7 kg (125 lb).   GENERAL APPEARANCE: " healthy, alert and no distress  MENTAL STATUS: alert  AIRWAY EXAM: Mallampatti Class I (visualization of the soft palate, fauces, uvula, anterior and posterior pillars)  RESP: lungs clear to auscultation - no rales, rhonchi or wheezes  CV: regular rates and rhythm      DIAGNOSTICS:    Not indicated      IMPRESSION   ASA Class 1 - Healthy patient, no medical problems        PLAN:       Plan for colonoscopy. We discussed the risks, benefits and alternatives and the patient wished to proceed.    The above has been forwarded to the consulting provider.      Signed Electronically by: Lonnie Hendrickson MD,MD  December 2, 2021

## 2022-07-23 ENCOUNTER — OFFICE VISIT (OUTPATIENT)
Dept: URGENT CARE | Facility: URGENT CARE | Age: 57
End: 2022-07-23
Payer: COMMERCIAL

## 2022-07-23 VITALS
SYSTOLIC BLOOD PRESSURE: 130 MMHG | OXYGEN SATURATION: 98 % | TEMPERATURE: 98 F | WEIGHT: 129 LBS | BODY MASS INDEX: 20.82 KG/M2 | DIASTOLIC BLOOD PRESSURE: 87 MMHG | HEART RATE: 71 BPM

## 2022-07-23 DIAGNOSIS — K60.2 ANAL FISSURE: Primary | ICD-10-CM

## 2022-07-23 LAB
BASOPHILS # BLD AUTO: 0 10E3/UL (ref 0–0.2)
BASOPHILS NFR BLD AUTO: 1 %
EOSINOPHIL # BLD AUTO: 0.1 10E3/UL (ref 0–0.7)
EOSINOPHIL NFR BLD AUTO: 1 %
ERYTHROCYTE [DISTWIDTH] IN BLOOD BY AUTOMATED COUNT: 12.2 % (ref 10–15)
HCT VFR BLD AUTO: 41.7 % (ref 35–47)
HGB BLD-MCNC: 13.8 G/DL (ref 11.7–15.7)
IMM GRANULOCYTES # BLD: 0 10E3/UL
IMM GRANULOCYTES NFR BLD: 0 %
LYMPHOCYTES # BLD AUTO: 1.3 10E3/UL (ref 0.8–5.3)
LYMPHOCYTES NFR BLD AUTO: 25 %
MCH RBC QN AUTO: 32.2 PG (ref 26.5–33)
MCHC RBC AUTO-ENTMCNC: 33.1 G/DL (ref 31.5–36.5)
MCV RBC AUTO: 97 FL (ref 78–100)
MONOCYTES # BLD AUTO: 0.5 10E3/UL (ref 0–1.3)
MONOCYTES NFR BLD AUTO: 10 %
NEUTROPHILS # BLD AUTO: 3.1 10E3/UL (ref 1.6–8.3)
NEUTROPHILS NFR BLD AUTO: 63 %
PLATELET # BLD AUTO: 205 10E3/UL (ref 150–450)
RBC # BLD AUTO: 4.28 10E6/UL (ref 3.8–5.2)
WBC # BLD AUTO: 5 10E3/UL (ref 4–11)

## 2022-07-23 PROCEDURE — 36415 COLL VENOUS BLD VENIPUNCTURE: CPT

## 2022-07-23 PROCEDURE — 85025 COMPLETE CBC W/AUTO DIFF WBC: CPT

## 2022-07-23 PROCEDURE — 99203 OFFICE O/P NEW LOW 30 MIN: CPT

## 2022-07-23 NOTE — PROGRESS NOTES
Assessment & Plan     Anal fissure    - CBC with platelets and differential; Future  - CBC with platelets and differential    Reassured with normal Hgb.  Advised close Follow-up if stools remain dark and any increased abdominal pain, fatigue or dizziness.  Sitz baths for treatment of fissure prn.  Close Follow-up if any new or worsening sx prn.    Shelly Tavarez MD   Urgent Care Provider  [facility] KENNETH Andrews is a 57 year old, presenting for the following health issues:  Urgent Care and Rectal Problem (Noticed Blood in stool this morning.)      HPI   Hx of colon perforation 4-2021 with normal repeat colonoscopy .  Hx of three glasses of wine overnight.  Lots of blueberries during the day.  BM this AM was normal but more maroon-colored with + blood with wiping.   No rectal pain.  No abdominal pain or N/V.  No hx of gastric ulcers.  Here with .    Review of Systems   Constitutional, HEENT, cardiovascular, pulmonary, GI, , musculoskeletal, neuro, skin, endocrine and psych systems are negative, except as otherwise noted.      Objective    /87   Pulse 71   Temp 98  F (36.7  C) (Oral)   Wt 58.5 kg (129 lb)   SpO2 98%   BMI 20.82 kg/m    Body mass index is 20.82 kg/m .  Physical Exam   GENERAL: healthy, alert and no distress  EYES: Eyes grossly normal to inspection, PERRL and conjunctivae and sclerae normal  ABDOMEN: soft, nontender  RECTAL: normal sphincter tone, no rectal masses, + anal fissue at 6 oclock position with patient prone- + bright red blood noted from fissure site- nontender.  MS: no gross musculoskeletal defects noted, no edema    Results for orders placed or performed in visit on 07/23/22 (from the past 24 hour(s))   CBC with platelets and differential    Narrative    The following orders were created for panel order CBC with platelets and differential.  Procedure                               Abnormality         Status                      ---------                               -----------         ------                     CBC with platelets and d...[[id number]]                      Final result                 Please view results for these tests on the individual orders.   CBC with platelets and differential   Result Value Ref Range    WBC Count 5.0 4.0 - 11.0 10e3/uL    RBC Count 4.28 3.80 - 5.20 10e6/uL    Hemoglobin 13.8 11.7 - 15.7 g/dL    Hematocrit 41.7 35.0 - 47.0 %    MCV 97 78 - 100 fL    MCH 32.2 26.5 - 33.0 pg    MCHC 33.1 31.5 - 36.5 g/dL    RDW 12.2 10.0 - 15.0 %    Platelet Count 205 150 - 450 10e3/uL    % Neutrophils 63 %    % Lymphocytes 25 %    % Monocytes 10 %    % Eosinophils 1 %    % Basophils 1 %    % Immature Granulocytes 0 %    Absolute Neutrophils 3.1 1.6 - 8.3 10e3/uL    Absolute Lymphocytes 1.3 0.8 - 5.3 10e3/uL    Absolute Monocytes 0.5 0.0 - 1.3 10e3/uL    Absolute Eosinophils 0.1 0.0 - 0.7 10e3/uL    Absolute Basophils 0.0 0.0 - 0.2 10e3/uL    Absolute Immature Granulocytes 0.0 <=0.4 10e3/uL                   .  ..

## 2023-06-19 ENCOUNTER — TRANSFERRED RECORDS (OUTPATIENT)
Dept: HEALTH INFORMATION MANAGEMENT | Facility: CLINIC | Age: 58
End: 2023-06-19

## 2023-09-06 ENCOUNTER — OFFICE VISIT (OUTPATIENT)
Dept: PLASTIC SURGERY | Facility: CLINIC | Age: 58
End: 2023-09-06
Payer: COMMERCIAL

## 2023-09-06 DIAGNOSIS — C44.219 BASAL CELL CARCINOMA OF EAR, LEFT: Primary | ICD-10-CM

## 2023-09-06 NOTE — LETTER
9/6/2023       RE: Juliana Uribe  [address] [place] [address]       Dear Colleague,    Thank you for referring your patient, Juliana Uribe, to the [facility] at [facility]. Please see a copy of my visit note below.    Juliana is in to see us regarding a new diagnosis of basal cell carcinoma of the left ear.  She is distressed because she had asked the dermatologist an opinion about the potential of malignancy for a protracted period of time before biopsy was taken.  I did discuss with her that many cancers are preceded by nonmalignant changes and she is at considerable risk for additional tumors given her skin type and history of sun exposure.  On the superior helix of the left ear there are recent biopsy sites with superficial skin breakdown but without exposure of cartilage.  The area of cutaneous irregularity extends for approximately 15 mm's and then it is in an area that is densely adherent to the underlying cartilage framework.  We discussed treatment options and she has an excellent dermatologic surgeon and Dr. Zamora.  I would be happy to carry out her reconstruction.  Given the likely dimensions of the defect I think a minimum two-stage reconstruction would be required.  It would include a cartilage graft from the opposite ear and a postauricular pedicled flap that would be in place for 3 weeks and then taken down.  This would like to likely need additional revision and secondary cosmetic correction.    She also had questions of whether a lower lid blepharoplasty could be carried out at the same time.  She in fact has very little excess skin she is mostly troubled by the rhytids in the lower lid that are created by squinting she has had some volume depletion and the medial orbital rim is becoming exposed with pseudo fat herniation superiorly.  I would avoid a lower lid blepharoplasty because of the risk of lower lid  retraction particularly in the light of the fact that she has very little skin excess.  She does have a fair amount of wrinkling and sun damage.  I think would be appropriate for her to consider having filler placed in the medial orbital rim region to give a smoother contour to the lower eyelid.  She could then have resurfacing with a chemical peel of the lower lid.  I had her see my partner Dr. Dong today and they can find a time to do these issues here in the office.  She is currently scheduled for excision of the tumor in November with a reconstructive date set with me in late November and the staff will look toward a time 3 weeks after that for the secondary procedure we spent 30 minutes or more in consultation today      Again, thank you for allowing me to participate in the care of your patient.      Sincerely,    MYA CHILDERS MD

## 2023-09-06 NOTE — LETTER
September 6, 2023  Re: Juliana Uribe  1965      Dear Dr. Skinner,    Thank you so much for referring Juliana Uribe to the [facility]. I had the pleasure of visiting with Juliana today.     Attached you will find a copy of my note. Please feel free to reach out to me with any questions, [phone].     Juliana is in to see us regarding a new diagnosis of basal cell carcinoma of the left ear.  She is distressed because she had asked the dermatologist an opinion about the potential of malignancy for a protracted period of time before biopsy was taken.  I did discuss with her that many cancers are preceded by nonmalignant changes and she is at considerable risk for additional tumors given her skin type and history of sun exposure.  On the superior helix of the left ear there are recent biopsy sites with superficial skin breakdown but without exposure of cartilage.  The area of cutaneous irregularity extends for approximately 15 mm's and then it is in an area that is densely adherent to the underlying cartilage framework.  We discussed treatment options and she has an excellent dermatologic surgeon and Dr. Zamora.  I would be happy to carry out her reconstruction.  Given the likely dimensions of the defect I think a minimum two-stage reconstruction would be required.  It would include a cartilage graft from the opposite ear and a postauricular pedicled flap that would be in place for 3 weeks and then taken down.  This would like to likely need additional revision and secondary cosmetic correction.    She also had questions of whether a lower lid blepharoplasty could be carried out at the same time.  She in fact has very little excess skin she is mostly troubled by the rhytids in the lower lid that are created by squinting she has had some volume depletion and the medial orbital rim is becoming exposed with pseudo fat herniation superiorly.  I would avoid a lower lid blepharoplasty because of the  risk of lower lid retraction particularly in the light of the fact that she has very little skin excess.  She does have a fair amount of wrinkling and sun damage.  I think would be appropriate for her to consider having filler placed in the medial orbital rim region to give a smoother contour to the lower eyelid.  She could then have resurfacing with a chemical peel of the lower lid.  I had her see my partner Dr. Dong today and they can find a time to do these issues here in the office.  She is currently scheduled for excision of the tumor in November with a reconstructive date set with me in late November and the staff will look toward a time 3 weeks after that for the secondary procedure we spent 30 minutes or more in consultation today        Your trust in our practice and care is much appreciated.    Sincerely,    MYA CHILDERS MD

## 2023-09-07 NOTE — PROGRESS NOTES
Juliana is in to see us regarding a new diagnosis of basal cell carcinoma of the left ear.  She is distressed because she had asked the dermatologist an opinion about the potential of malignancy for a protracted period of time before biopsy was taken.  I did discuss with her that many cancers are preceded by nonmalignant changes and she is at considerable risk for additional tumors given her skin type and history of sun exposure.  On the superior helix of the left ear there are recent biopsy sites with superficial skin breakdown but without exposure of cartilage.  The area of cutaneous irregularity extends for approximately 15 mm's and then it is in an area that is densely adherent to the underlying cartilage framework.  We discussed treatment options and she has an excellent dermatologic surgeon and Dr. Zamora.  I would be happy to carry out her reconstruction.  Given the likely dimensions of the defect I think a minimum two-stage reconstruction would be required.  It would include a cartilage graft from the opposite ear and a postauricular pedicled flap that would be in place for 3 weeks and then taken down.  This would like to likely need additional revision and secondary cosmetic correction.    She also had questions of whether a lower lid blepharoplasty could be carried out at the same time.  She in fact has very little excess skin she is mostly troubled by the rhytids in the lower lid that are created by squinting she has had some volume depletion and the medial orbital rim is becoming exposed with pseudo fat herniation superiorly.  I would avoid a lower lid blepharoplasty because of the risk of lower lid retraction particularly in the light of the fact that she has very little skin excess.  She does have a fair amount of wrinkling and sun damage.  I think would be appropriate for her to consider having filler placed in the medial orbital rim region to give a smoother contour to the lower eyelid.  She could  then have resurfacing with a chemical peel of the lower lid.  I had her see my partner Dr. Dong today and they can find a time to do these issues here in the office.  She is currently scheduled for excision of the tumor in November with a reconstructive date set with me in late November and the staff will look toward a time 3 weeks after that for the secondary procedure we spent 30 minutes or more in consultation today

## 2023-10-20 ENCOUNTER — HOSPITAL ENCOUNTER (OUTPATIENT)
Dept: MRI IMAGING | Facility: CLINIC | Age: 58
Discharge: HOME | End: 2023-10-20
Attending: INTERNAL MEDICINE | Admitting: INTERNAL MEDICINE
Payer: COMMERCIAL

## 2023-10-20 DIAGNOSIS — R74.8 ABNORMAL LEVELS OF OTHER SERUM ENZYMES: ICD-10-CM

## 2023-10-20 DIAGNOSIS — F10.20 ALCOHOL DEPENDENCE, UNCOMPLICATED (H): ICD-10-CM

## 2023-10-20 DIAGNOSIS — R78.79 FINDING OF ABNORMAL LEVEL OF HEAVY METALS IN BLOOD: ICD-10-CM

## 2023-10-20 PROCEDURE — A9585 GADOBUTROL INJECTION: HCPCS | Performed by: INTERNAL MEDICINE

## 2023-10-20 PROCEDURE — 255N000002 HC RX 255 OP 636: Performed by: INTERNAL MEDICINE

## 2023-10-20 PROCEDURE — 74183 MRI ABD W/O CNTR FLWD CNTR: CPT

## 2023-10-20 RX ORDER — GADOBUTROL 604.72 MG/ML
6 INJECTION INTRAVENOUS ONCE
Status: COMPLETED | OUTPATIENT
Start: 2023-10-20 | End: 2023-10-20

## 2023-10-20 RX ADMIN — GADOBUTROL 6 ML: 604.72 INJECTION INTRAVENOUS at 07:51

## 2023-11-19 DIAGNOSIS — Z98.890 POSTOPERATIVE STATE: Primary | ICD-10-CM

## 2023-11-19 RX ORDER — ONDANSETRON 4 MG/1
4 TABLET, ORALLY DISINTEGRATING ORAL EVERY 8 HOURS PRN
Qty: 12 TABLET | Refills: 0 | Status: SHIPPED | OUTPATIENT
Start: 2023-11-19

## 2023-11-19 RX ORDER — ACETAMINOPHEN 500 MG
500 TABLET ORAL EVERY 6 HOURS
Qty: 12 TABLET | Refills: 0 | Status: SHIPPED | OUTPATIENT
Start: 2023-11-19 | End: 2023-11-22

## 2023-11-19 RX ORDER — OXYCODONE HYDROCHLORIDE 5 MG/1
5 TABLET ORAL EVERY 6 HOURS PRN
Qty: 15 TABLET | Refills: 0 | Status: SHIPPED | OUTPATIENT
Start: 2023-11-19

## 2023-11-22 ENCOUNTER — TRANSFERRED RECORDS (OUTPATIENT)
Dept: HEALTH INFORMATION MANAGEMENT | Facility: CLINIC | Age: 58
End: 2023-11-22
Payer: COMMERCIAL

## 2023-11-29 ENCOUNTER — OFFICE VISIT (OUTPATIENT)
Dept: PLASTIC SURGERY | Facility: CLINIC | Age: 58
End: 2023-11-29
Payer: COMMERCIAL

## 2023-11-29 DIAGNOSIS — Z98.890 POSTOPERATIVE STATE: Primary | ICD-10-CM

## 2023-11-29 NOTE — PROGRESS NOTES
Saw Juliana one week s/p mohs recon to left helical rim defect (11/22/23).     She reports she is doing well, although this has been a very emotional process for her. The patient verbalized she has been very anxious, she has not looked at her ear and does not wish to do so today. Pt became frustrated when discussing post op cares and realistic healing expectations as she is upset with the situation overall. She is having difficulty accepting the current state of her ear. Reassurance provided to the pt that she is healing appropriately and things will continue to improve. We are here to support her.    Bolster removed from ear without difficulty. Graft is appropriately colored and intact. Incision cleansed with hydrogen peroxide. She is healing appropriately, incisions well approximated. Sutures removed from incision line.     We discussed continued cares including sun protection and the future use of silicone scar gel.     All questions answered at this time. Pt instructed to reach out if questions or concerns arise.    Follow-up appt made.     Skyla Faria RN  11/29/2023 5:26 PM

## 2024-01-03 ENCOUNTER — OFFICE VISIT (OUTPATIENT)
Dept: PLASTIC SURGERY | Facility: CLINIC | Age: 59
End: 2024-01-03
Payer: COMMERCIAL

## 2024-01-03 DIAGNOSIS — Z98.890 POSTOPERATIVE STATE: Primary | ICD-10-CM

## 2024-01-03 NOTE — PROGRESS NOTES
Juliana is back after her surgery and the skin graft has taken very nicely.  There is some erythema as expected with a graft.  If this does not fade with time I visited with her about the dermatologist carrying out some dye laser treatments.  She is pleased with the outcome as of my photos were taken today and she will see me as needed.MYA CHILDERS MD

## 2024-01-03 NOTE — LETTER
January 3, 2024  Re: Juliana Uribe  1965    Dear Dr. Zamora,    Thank you so much for referring Juliana Uribe to the [facility]. I had the pleasure of visiting with Juliana today.     Attached you will find a copy of my note. Please feel free to reach out to me with any questions, [phone].     Juliana is back after her surgery and the skin graft has taken very nicely.  There is some erythema as expected with a graft.  If this does not fade with time I visited with her about the dermatologist carrying out some dye laser treatments.  She is pleased with the outcome as of my photos were taken today and she will see me as needed.        Your trust in our practice and care is much appreciated.    Sincerely,    MYA CHILDERS MD

## 2024-01-03 NOTE — LETTER
1/3/2024       RE: Juliana Uribe  [address] [place] [address]         Dear Colleague,    Thank you for referring your patient, Juliana Uribe, to the [facility] at [facility]. Please see a copy of my visit note below.    Juliana is back after her surgery and the skin graft has taken very nicely.  There is some erythema as expected with a graft.  If this does not fade with time I visited with her about the dermatologist carrying out some dye laser treatments.  She is pleased with the outcome as of my photos were taken today and she will see me as needed.      Again, thank you for allowing me to participate in the care of your patient.      Sincerely,    MYA CHILDERS MD

## 2025-07-01 ENCOUNTER — OFFICE VISIT (OUTPATIENT)
Dept: URGENT CARE | Facility: URGENT CARE | Age: 60
End: 2025-07-01
Payer: COMMERCIAL

## 2025-07-01 VITALS
HEIGHT: 66 IN | DIASTOLIC BLOOD PRESSURE: 62 MMHG | WEIGHT: 135 LBS | HEART RATE: 77 BPM | OXYGEN SATURATION: 96 % | SYSTOLIC BLOOD PRESSURE: 126 MMHG | BODY MASS INDEX: 21.69 KG/M2 | RESPIRATION RATE: 16 BRPM | TEMPERATURE: 98.5 F

## 2025-07-01 DIAGNOSIS — M79.89 RIGHT LEG SWELLING: Primary | ICD-10-CM

## 2025-07-01 LAB
ANION GAP SERPL CALCULATED.3IONS-SCNC: 11 MMOL/L (ref 3–14)
BUN SERPL-MCNC: 19 MG/DL (ref 7–30)
CALCIUM SERPL-MCNC: 8.6 MG/DL (ref 8.5–10.1)
CHLORIDE BLD-SCNC: 108 MMOL/L (ref 94–109)
CO2 SERPL-SCNC: 25 MMOL/L (ref 20–32)
CREAT SERPL-MCNC: 0.7 MG/DL (ref 0.52–1.04)
D DIMER PPP FEU-MCNC: 0.34 UG/ML FEU (ref 0–0.5)
EGFRCR SERPLBLD CKD-EPI 2021: >90 ML/MIN/1.73M2
ERYTHROCYTE [DISTWIDTH] IN BLOOD BY AUTOMATED COUNT: 12.6 % (ref 10–15)
GLUCOSE BLD-MCNC: 127 MG/DL (ref 70–99)
HCT VFR BLD AUTO: 41.1 % (ref 35–47)
HGB BLD-MCNC: 13.7 G/DL (ref 11.7–15.7)
MCH RBC QN AUTO: 31.2 PG (ref 26.5–33)
MCHC RBC AUTO-ENTMCNC: 33.3 G/DL (ref 31.5–36.5)
MCV RBC AUTO: 94 FL (ref 78–100)
PLATELET # BLD AUTO: 233 10E3/UL (ref 150–450)
POTASSIUM BLD-SCNC: 4 MMOL/L (ref 3.4–5.3)
RBC # BLD AUTO: 4.39 10E6/UL (ref 3.8–5.2)
SODIUM SERPL-SCNC: 144 MMOL/L (ref 135–145)
WBC # BLD AUTO: 7.1 10E3/UL (ref 4–11)

## 2025-07-01 PROCEDURE — 85379 FIBRIN DEGRADATION QUANT: CPT | Performed by: EMERGENCY MEDICINE

## 2025-07-01 PROCEDURE — 80048 BASIC METABOLIC PNL TOTAL CA: CPT | Performed by: EMERGENCY MEDICINE

## 2025-07-01 PROCEDURE — 99214 OFFICE O/P EST MOD 30 MIN: CPT | Performed by: EMERGENCY MEDICINE

## 2025-07-01 PROCEDURE — 3074F SYST BP LT 130 MM HG: CPT | Performed by: EMERGENCY MEDICINE

## 2025-07-01 PROCEDURE — 85027 COMPLETE CBC AUTOMATED: CPT | Performed by: EMERGENCY MEDICINE

## 2025-07-01 PROCEDURE — 36415 COLL VENOUS BLD VENIPUNCTURE: CPT | Performed by: EMERGENCY MEDICINE

## 2025-07-01 PROCEDURE — 3078F DIAST BP <80 MM HG: CPT | Performed by: EMERGENCY MEDICINE

## 2025-07-01 NOTE — PROGRESS NOTES
Urgent Care Clinic Visit    Chief Complaint   Patient presents with    Urgent Care    Knee Pain     Right leg/knee swollen and pain for months.                7/1/2025     6:08 PM   Additional Questions   Roomed by Sandra

## 2025-07-02 NOTE — PROGRESS NOTES
Assessment & Plan     Diagnosis:    ICD-10-CM    1. Right leg swelling  M79.89 D dimer, quantitative     CBC with platelets     Basic metabolic panel  (Ca, Cl, CO2, Creat, Gluc, K, Na, BUN)     D dimer, quantitative     CBC with platelets     Basic metabolic panel  (Ca, Cl, CO2, Creat, Gluc, K, Na, BUN)        Medical Decision Making:  Juliana Uribe is a 59 year old female who presents for evaluation of leg swelling. A broad differential was considered including Baker's cyst rupture, Baker's cyst, hematoma, rupture, compartment syndrome, muscle rupture, DVT, superficial thrombophlebitis, compression of the venous structures higher up in the abdomen and or leg cellulitis/ abscess, etc.  D-Dimer is negative; effectively ruling out DVT in this otherwise low risk patient. She does do a lot of squats and exercises vigorously but admit to a lot of salt intake.  She does not appear to have a knee issue, but I discussed this could be stemming from this, also from excessive sodium intake.  Labs are reassuring as noted below with normal kidney function, no signs of electrolyte imbalance, blood sugar mildly elevated at 127.  No history of diabetes.    There are no signs of compartment syndrome or other worrisome etiologies at this point so outpatient management is indicated.  I doubt more central causes of their swelling like renal failure, CHF, liver disease, etc. They will elevate leg, do gentle dorsal flexion and plantar flexion motions, take Tylenol for pain, and avoid strenuous activity. Can try compression stocking as well and decreasing sodium intake. They should followup with her primary care doctor.  she is perhaps at increased risk now of a DVT.  Patient verbalizes understanding and agreement with the plan including reasons to go to the ER. All questions answered.     30 minutes spent by me on the date of the encounter doing chart review, review of outside records, review of test results, interpretation of  "tests, patient visit, and documentation       Harvey Brown PA-C  [facility]    Subjective     Juliana Uribe is a 59 year old female who presents to clinic today for the following health issues:  Chief Complaint   Patient presents with    Urgent Care    Knee Pain     Right leg/knee swollen and pain for months. 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       HPI    Patient states for the last couple of months she has noticed that her right leg below the knee appears to be slightly larger than the left.  She does workout vigorously, does squats almost every single day and a lot of pretty intense workouts, but she has not been having any pain in her leg.  She feels her knee is a little swollen, but not really painful, it does seem to be the weaker knee/leg however compared with her left.  She denies any recent surgeries, history of blood clots in the legs or lungs, long peers of immobilization.  She does admit that she has a lot of salt added to her food.  She has not noticed much change in the size of the leg swelling, does not notice it pitting.  No chest pain, shortness of breath, recent injuries to the leg, knee pain, numbness or weakness in the leg, abdominal or back pain or other concerns. No history of kidney or heart problems.     Review of Systems    See HPI    Objective      Vitals: /62   Pulse 77   Temp 98.5  F (36.9  C) (Tympanic)   Resp 16   Ht 1.676 m (5' 6\")   Wt 61.2 kg (135 lb)   SpO2 96%   BMI 21.79 kg/m        Patient Vitals for the past 24 hrs:   BP Temp Temp src Pulse Resp SpO2 Height Weight   07/01/25 1802 126/62 98.5  F (36.9  C) Tympanic 77 16 96 % 1.676 m (5' 6\") 61.2 kg (135 lb)       Vital signs reviewed by: Harvey Brown PA-C    Physical Exam   Constitutional: Patient is alert and cooperative. No acute distress.  Cardiovascular: Regular rate and rhythm  Pulmonary/Chest: Lungs are clear to auscultation throughout. Effort normal. No respiratory " distress. No wheezes, rales or rhonchi.  Neurological: Alert and oriented x3.  MSK/Skin: Right lower extremity from the knee down appears slightly larger than the left lower extremity.  No pitting edema.  Popliteal region does feel more full on the right compared with the left.  This is nontender.  No pulsatile popliteal mass.  No calf pain or palpable cords.  Negative Homans' sign.  No erythema, warmth, swelling fluctuance or areas of pointing.  Normal range of motion at the knee and ankle.  Psychiatric:The patient has a normal mood and affect.     Labs/Imaging:  Results for orders placed or performed in visit on 07/01/25   D dimer, quantitative     Status: Normal   Result Value Ref Range    D-Dimer Quantitative 0.34 0.00 - 0.50 ug/mL FEU    Narrative    This D-dimer assay is intended for use in conjunction with a clinical pretest probability assessment model to exclude pulmonary embolism (PE) and deep venous thrombosis (DVT) in outpatients suspected of PE or DVT. The cut-off value is 0.50 ug/mL FEU.    For patients 50 years of age or older, the application of age-adjusted cut-off values for D-Dimer may increase the specificity without significant effect on sensitivity. The literature suggested calculation age adjusted cut-off in ug/L = age in years x 10 ug/L. The results in this laboratory are reported as ug/mL rather than ug/L. The calculation for age adjusted cut off in ug/mL= age in years x 0.01 ug/mL. For example, the cut off for a 76 year old male is 76 x 0.01 ug/mL = 0.76 ug/mL (760 ug/L).    ALEKSANDRA Hanna et al. Age adjusted D-dimer cut-off levels to rule out pulmonary embolism: The ADJUST-PE Study. FELIPA 2014;311:8630-2344.; HJ Nubia et al. Diagnostic accuracy of conventional or age adjusted D-dimer cutoff values in older patients with suspected venous thromboembolism. Systemic review and meta-analysis. BMJ 2013:346:f2492.   CBC with platelets     Status: Normal   Result Value Ref Range    WBC Count 7.1 4.0  - 11.0 10e3/uL    RBC Count 4.39 3.80 - 5.20 10e6/uL    Hemoglobin 13.7 11.7 - 15.7 g/dL    Hematocrit 41.1 35.0 - 47.0 %    MCV 94 78 - 100 fL    MCH 31.2 26.5 - 33.0 pg    MCHC 33.3 31.5 - 36.5 g/dL    RDW 12.6 10.0 - 15.0 %    Platelet Count 233 150 - 450 10e3/uL   Basic metabolic panel  (Ca, Cl, CO2, Creat, Gluc, K, Na, BUN)     Status: Abnormal   Result Value Ref Range    Sodium 144 135 - 145 mmol/L    Potassium 4.0 3.4 - 5.3 mmol/L    Chloride 108 94 - 109 mmol/L    Carbon Dioxide (CO2) 25 20 - 32 mmol/L    Anion Gap 11 3 - 14 mmol/L    Urea Nitrogen 19 7 - 30 mg/dL    Creatinine 0.70 0.52 - 1.04 mg/dL    GFR Estimate >90 >60 mL/min/1.73m2    Calcium 8.6 8.5 - 10.1 mg/dL    Glucose 127 (H) 70 - 99 mg/dL           Harvey Brown PA-C, July 1, 2025

## 2025-07-17 ENCOUNTER — OFFICE VISIT (OUTPATIENT)
Dept: URGENT CARE | Facility: URGENT CARE | Age: 60
End: 2025-07-17
Payer: COMMERCIAL

## 2025-07-17 VITALS
OXYGEN SATURATION: 96 % | DIASTOLIC BLOOD PRESSURE: 75 MMHG | RESPIRATION RATE: 14 BRPM | TEMPERATURE: 97.7 F | WEIGHT: 135 LBS | HEART RATE: 82 BPM | BODY MASS INDEX: 21.69 KG/M2 | SYSTOLIC BLOOD PRESSURE: 124 MMHG | HEIGHT: 66 IN

## 2025-07-17 DIAGNOSIS — S61.012A LACERATION WITHOUT FOREIGN BODY OF LEFT THUMB WITHOUT DAMAGE TO NAIL, INITIAL ENCOUNTER: Primary | ICD-10-CM

## 2025-07-17 NOTE — PROGRESS NOTES
Urgent Care Clinic Visit  Rapid rooming was initiated.  Provider was consulted, patient will remain in room and provider will be with patient as soon as possible.  Chief Complaint   Patient presents with    Urgent Care     Laceration on left thumb -TDAP 6/29/2021     Laceration       Review of last Tetanus vaccine: Tetanus vaccine has been given within past 5 years          7/17/2025     2:36 PM   Additional Questions   Roomed by Viktoriya

## 2025-07-17 NOTE — PROGRESS NOTES
Assessment & Plan   Left thumb laceration:    Patient accidentally cut herself with a .  Clean wound edges and nonbleeding upon arrival.  Repaired using Dermabond (see details below).  Discussed signs and symptoms to watch out for in terms of infection.  If she has any concerns about her extensor tendon function not she will follow-up and we went through indications for that as well.  Last tetanus was in 2021.  There are no diagnoses linked to this encounter.           No follow-ups on file.    Luis Carlos Lafleur MD  [facility] KENNETH Andrews is a 60 year old female who presents to clinic today for the following health issues:  Chief Complaint   Patient presents with    Urgent Care     Laceration on left thumb -TDAP 6/29/2021     Laceration         7/17/2025     2:36 PM   Additional Questions   Roomed by Viktoriya Laceyeration          Laceration    Mechanism of injury: Accidentally cut the dorsal surface of her left thumb with a    History provided by: Patient  Time of injury was 1 hours(s) ago.    This is a non-work related injury.    Associated symptoms: Denies numbness, weakness, or loss of function    Last tetanus booster within 10 years: Yes    LACERATION EXAM:   Size of laceration: 1 centimeters  Characteristics of the laceration: clean and linear  Depth of laceration: Full-thickness  Tendon function intact: Yes  Sensation to light touch intact: Yes  Pulses/capillary refill intact: Yes  Foreign body: No    Picture included in patient's chart: no    PROCEDURE NOTE:  Anesthesia: None  Prepped and draped in the usual sterile fashion  Wound irrigated with sterile water  Wound was explored for any foreign bodies and evaluated for tendon, nerve, vessel or joint involvement.    Closure was simple  Laceration was closed with Dermabond  Bandage was applied  Patient tolerated the procedure well            Review of Systems        Objective    /75   Pulse 82   " Temp 97.7  F (36.5  C) (Tympanic)   Resp 14   Ht 1.676 m (5' 6\")   Wt 61.2 kg (135 lb)   SpO2 96%   BMI 21.79 kg/m    Physical Exam             "

## (undated) RX ORDER — FENTANYL CITRATE 50 UG/ML
INJECTION, SOLUTION INTRAMUSCULAR; INTRAVENOUS
Status: DISPENSED
Start: 2021-04-29

## (undated) RX ORDER — FENTANYL CITRATE 50 UG/ML
INJECTION, SOLUTION INTRAMUSCULAR; INTRAVENOUS
Status: DISPENSED
Start: 2021-12-02